# Patient Record
Sex: MALE | Race: WHITE | Employment: UNEMPLOYED | URBAN - METROPOLITAN AREA
[De-identification: names, ages, dates, MRNs, and addresses within clinical notes are randomized per-mention and may not be internally consistent; named-entity substitution may affect disease eponyms.]

---

## 2017-10-07 PROBLEM — Z72.0 TOBACCO ABUSE: Chronic | Status: ACTIVE | Noted: 2017-10-07

## 2017-10-07 PROBLEM — F12.10 MARIJUANA ABUSE: Chronic | Status: ACTIVE | Noted: 2017-10-07

## 2017-10-07 PROBLEM — L02.419 AXILLARY ABSCESS: Status: ACTIVE | Noted: 2017-10-07

## 2018-09-07 ENCOUNTER — HOSPITAL ENCOUNTER (EMERGENCY)
Age: 28
Discharge: HOME | End: 2018-09-07
Attending: EMERGENCY MEDICINE
Payer: MEDICAID

## 2018-09-07 VITALS
RESPIRATION RATE: 14 BRPM | DIASTOLIC BLOOD PRESSURE: 80 MMHG | HEIGHT: 70 IN | HEART RATE: 94 BPM | OXYGEN SATURATION: 100 % | BODY MASS INDEX: 21.9 KG/M2 | WEIGHT: 153 LBS | SYSTOLIC BLOOD PRESSURE: 136 MMHG | TEMPERATURE: 98.1 F

## 2018-09-07 DIAGNOSIS — J20.9 ACUTE BRONCHITIS, UNSPECIFIED ORGANISM: ICD-10-CM

## 2018-09-07 DIAGNOSIS — K08.89 PAIN, DENTAL: Primary | ICD-10-CM

## 2018-09-07 PROCEDURE — 99282 EMERGENCY DEPT VISIT SF MDM: CPT

## 2018-09-07 PROCEDURE — G0381 LEV 2 HOSP TYPE B ED VISIT: HCPCS

## 2018-09-07 RX ORDER — IBUPROFEN 800 MG/1
800 TABLET ORAL EVERY 8 HOURS PRN
Qty: 30 TABLET | Refills: 0 | Status: SHIPPED | OUTPATIENT
Start: 2018-09-07 | End: 2019-02-09 | Stop reason: SDUPTHER

## 2018-09-07 RX ORDER — BROMPHENIRAMINE MALEATE, PSEUDOEPHEDRINE HYDROCHLORIDE, AND DEXTROMETHORPHAN HYDROBROMIDE 2; 30; 10 MG/5ML; MG/5ML; MG/5ML
5 SYRUP ORAL 4 TIMES DAILY PRN
Qty: 120 ML | Refills: 0 | Status: SHIPPED | OUTPATIENT
Start: 2018-09-07 | End: 2019-02-09

## 2018-09-07 RX ORDER — METHOCARBAMOL 750 MG/1
750 TABLET, FILM COATED ORAL 2 TIMES DAILY
COMMUNITY
End: 2019-02-09

## 2018-09-07 RX ORDER — DOXYCYCLINE 100 MG/1
100 CAPSULE ORAL 2 TIMES DAILY
Qty: 20 CAPSULE | Refills: 0 | Status: SHIPPED | OUTPATIENT
Start: 2018-09-07 | End: 2019-02-09

## 2018-09-07 ASSESSMENT — PAIN DESCRIPTION - PAIN TYPE: TYPE: ACUTE PAIN

## 2018-09-07 ASSESSMENT — ENCOUNTER SYMPTOMS
NAUSEA: 0
SHORTNESS OF BREATH: 0
EYE PAIN: 0
EYE DISCHARGE: 0
EYE REDNESS: 0
SINUS PRESSURE: 0
DIARRHEA: 0
COUGH: 1
VOMITING: 0
BACK PAIN: 0
ABDOMINAL PAIN: 0
WHEEZING: 0
SORE THROAT: 0
RHINORRHEA: 1

## 2018-09-07 ASSESSMENT — PAIN SCALES - GENERAL: PAINLEVEL_OUTOF10: 10

## 2018-09-07 ASSESSMENT — PAIN DESCRIPTION - ORIENTATION: ORIENTATION: RIGHT

## 2018-09-07 NOTE — ED PROVIDER NOTES
The history is provided by the patient. Dental Pain   Location:  Generalized  Severity:  Moderate  Onset quality:  Gradual  Duration:  3 days  Chronicity:  Chronic  Context: dental caries    Associated symptoms: congestion    Associated symptoms: no fever and no headaches    URI   Presenting symptoms: congestion, cough and rhinorrhea    Presenting symptoms: no ear pain, no fever and no sore throat    Severity:  Moderate  Onset quality:  Gradual  Duration:  3 days  Progression:  Worsening  Chronicity:  New  Associated symptoms: no arthralgias, no headaches and no wheezing        Review of Systems   Constitutional: Negative for chills and fever. HENT: Positive for congestion and rhinorrhea. Negative for ear pain, sinus pressure and sore throat. Eyes: Negative for pain, discharge and redness. Respiratory: Positive for cough. Negative for shortness of breath and wheezing. Cardiovascular: Negative for chest pain. Gastrointestinal: Negative for abdominal pain, diarrhea, nausea and vomiting. Genitourinary: Negative for dysuria and frequency. Musculoskeletal: Negative for arthralgias and back pain. Skin: Negative for rash and wound. Neurological: Negative for weakness and headaches. Hematological: Negative for adenopathy. All other systems reviewed and are negative. Physical Exam   Constitutional: He is oriented to person, place, and time. He appears well-developed and well-nourished. HENT:   Head: Normocephalic and atraumatic. Right Ear: Hearing, tympanic membrane and external ear normal.   Left Ear: Hearing, tympanic membrane and external ear normal.   Nose: Mucosal edema and rhinorrhea present. Right sinus exhibits no maxillary sinus tenderness and no frontal sinus tenderness. Left sinus exhibits no maxillary sinus tenderness and no frontal sinus tenderness. Mouth/Throat: Uvula is midline, oropharynx is clear and moist and mucous membranes are normal. No trismus in the jaw.  Dental caries present. No uvula swelling. Eyes: Pupils are equal, round, and reactive to light. Conjunctivae, EOM and lids are normal.   Neck: Normal range of motion. Neck supple. Cardiovascular: Normal rate, regular rhythm and normal heart sounds. No murmur heard. Pulmonary/Chest: Effort normal. He has rhonchi. Abdominal: Soft. Bowel sounds are normal. There is no tenderness. There is no rigidity, no rebound, no guarding and no CVA tenderness. Musculoskeletal: He exhibits no edema. Neurological: He is alert and oriented to person, place, and time. He has normal strength. No cranial nerve deficit or sensory deficit. Coordination and gait normal. GCS eye subscore is 4. GCS verbal subscore is 5. GCS motor subscore is 6. Skin: Skin is warm and dry. No abrasion and no rash noted. Nursing note and vitals reviewed. Procedures    MDM            --------------------------------------------- PAST HISTORY ---------------------------------------------  Past Medical History:  has no past medical history on file. Past Surgical History:  has a past surgical history that includes Tonsillectomy. Social History:  reports that he has been smoking Cigarettes. He has a 10.00 pack-year smoking history. He has never used smokeless tobacco. He reports that he uses drugs, including Marijuana. He reports that he does not drink alcohol. Family History: Family history is unknown by patient. The patients home medications have been reviewed. Allergies: Penicillins    -------------------------------------------------- RESULTS -------------------------------------------------  Labs:  No results found for this visit on 09/07/18. Radiology:  No orders to display       ------------------------- NURSING NOTES AND VITALS REVIEWED ---------------------------  Date / Time Roomed:  9/7/2018  2:25 PM  ED Bed Assignment:  04/04    The nursing notes within the ED encounter and vital signs as below have been reviewed.

## 2019-02-09 ENCOUNTER — HOSPITAL ENCOUNTER (EMERGENCY)
Age: 29
Discharge: HOME | End: 2019-02-09
Payer: MEDICARE

## 2019-02-09 VITALS
TEMPERATURE: 97.9 F | SYSTOLIC BLOOD PRESSURE: 134 MMHG | HEART RATE: 87 BPM | DIASTOLIC BLOOD PRESSURE: 66 MMHG | HEIGHT: 70 IN | RESPIRATION RATE: 18 BRPM | OXYGEN SATURATION: 96 % | BODY MASS INDEX: 23.64 KG/M2 | WEIGHT: 165.13 LBS

## 2019-02-09 DIAGNOSIS — K08.89 PAIN, DENTAL: Primary | ICD-10-CM

## 2019-02-09 PROCEDURE — 6370000000 HC RX 637 (ALT 250 FOR IP): Performed by: NURSE PRACTITIONER

## 2019-02-09 PROCEDURE — 99282 EMERGENCY DEPT VISIT SF MDM: CPT

## 2019-02-09 RX ORDER — IBUPROFEN 400 MG/1
400 TABLET ORAL EVERY 6 HOURS PRN
Qty: 120 TABLET | Refills: 0 | Status: SHIPPED | OUTPATIENT
Start: 2019-02-09 | End: 2019-06-22 | Stop reason: ALTCHOICE

## 2019-02-09 RX ORDER — ACETAMINOPHEN 500 MG
500 TABLET ORAL EVERY 6 HOURS PRN
Status: ON HOLD | COMMUNITY
End: 2019-07-17 | Stop reason: ALTCHOICE

## 2019-02-09 RX ORDER — ACETAMINOPHEN 500 MG
1000 TABLET ORAL ONCE
Status: COMPLETED | OUTPATIENT
Start: 2019-02-09 | End: 2019-02-09

## 2019-02-09 RX ORDER — IBUPROFEN 800 MG/1
800 TABLET ORAL ONCE
Status: COMPLETED | OUTPATIENT
Start: 2019-02-09 | End: 2019-02-09

## 2019-02-09 RX ORDER — CLINDAMYCIN HYDROCHLORIDE 300 MG/1
300 CAPSULE ORAL 4 TIMES DAILY
Qty: 28 CAPSULE | Refills: 0 | Status: SHIPPED | OUTPATIENT
Start: 2019-02-09 | End: 2019-02-16

## 2019-02-09 RX ORDER — CLINDAMYCIN HYDROCHLORIDE 150 MG/1
300 CAPSULE ORAL ONCE
Status: COMPLETED | OUTPATIENT
Start: 2019-02-09 | End: 2019-02-09

## 2019-02-09 RX ADMIN — ACETAMINOPHEN 1000 MG: 500 TABLET, FILM COATED ORAL at 01:14

## 2019-02-09 RX ADMIN — CLINDAMYCIN HYDROCHLORIDE 300 MG: 150 CAPSULE ORAL at 01:14

## 2019-02-09 RX ADMIN — IBUPROFEN 800 MG: 800 TABLET, FILM COATED ORAL at 01:14

## 2019-02-09 ASSESSMENT — PAIN DESCRIPTION - LOCATION: LOCATION: TEETH

## 2019-02-09 ASSESSMENT — PAIN DESCRIPTION - DESCRIPTORS: DESCRIPTORS: ACHING;BURNING

## 2019-02-09 ASSESSMENT — PAIN SCALES - GENERAL: PAINLEVEL_OUTOF10: 10

## 2019-02-09 ASSESSMENT — PAIN - FUNCTIONAL ASSESSMENT: PAIN_FUNCTIONAL_ASSESSMENT: PREVENTS OR INTERFERES SOME ACTIVE ACTIVITIES AND ADLS

## 2019-02-09 ASSESSMENT — PAIN DESCRIPTION - FREQUENCY: FREQUENCY: CONTINUOUS

## 2019-02-09 ASSESSMENT — PAIN DESCRIPTION - PAIN TYPE: TYPE: ACUTE PAIN

## 2019-02-09 ASSESSMENT — PAIN DESCRIPTION - PROGRESSION: CLINICAL_PROGRESSION: NOT CHANGED

## 2019-06-22 ENCOUNTER — HOSPITAL ENCOUNTER (EMERGENCY)
Age: 29
Discharge: HOME | End: 2019-06-22
Payer: MEDICARE

## 2019-06-22 ENCOUNTER — APPOINTMENT (OUTPATIENT)
Dept: GENERAL RADIOLOGY | Age: 29
End: 2019-06-22
Payer: MEDICARE

## 2019-06-22 VITALS
HEART RATE: 78 BPM | BODY MASS INDEX: 21.47 KG/M2 | WEIGHT: 150 LBS | DIASTOLIC BLOOD PRESSURE: 76 MMHG | HEIGHT: 70 IN | OXYGEN SATURATION: 97 % | SYSTOLIC BLOOD PRESSURE: 146 MMHG | RESPIRATION RATE: 18 BRPM | TEMPERATURE: 98.3 F

## 2019-06-22 DIAGNOSIS — S20.211A CONTUSION OF RIGHT CHEST WALL, INITIAL ENCOUNTER: Primary | ICD-10-CM

## 2019-06-22 DIAGNOSIS — S20.211A BRUISED RIBS, RIGHT, INITIAL ENCOUNTER: ICD-10-CM

## 2019-06-22 PROCEDURE — 6370000000 HC RX 637 (ALT 250 FOR IP): Performed by: NURSE PRACTITIONER

## 2019-06-22 PROCEDURE — 99285 EMERGENCY DEPT VISIT HI MDM: CPT

## 2019-06-22 PROCEDURE — 71101 X-RAY EXAM UNILAT RIBS/CHEST: CPT

## 2019-06-22 RX ORDER — NAPROXEN 500 MG/1
500 TABLET ORAL 2 TIMES DAILY WITH MEALS
Qty: 28 TABLET | Refills: 0 | Status: SHIPPED | OUTPATIENT
Start: 2019-06-22 | End: 2019-07-24 | Stop reason: ALTCHOICE

## 2019-06-22 RX ORDER — KETOROLAC TROMETHAMINE 10 MG/1
10 TABLET, FILM COATED ORAL ONCE
Status: COMPLETED | OUTPATIENT
Start: 2019-06-22 | End: 2019-06-22

## 2019-06-22 RX ORDER — LIDOCAINE 4 G/G
1 PATCH TOPICAL DAILY
Status: DISCONTINUED | OUTPATIENT
Start: 2019-06-23 | End: 2019-06-23 | Stop reason: HOSPADM

## 2019-06-22 RX ORDER — OXYCODONE HYDROCHLORIDE AND ACETAMINOPHEN 5; 325 MG/1; MG/1
1 TABLET ORAL ONCE
Status: COMPLETED | OUTPATIENT
Start: 2019-06-22 | End: 2019-06-22

## 2019-06-22 RX ORDER — OXYCODONE HYDROCHLORIDE AND ACETAMINOPHEN 5; 325 MG/1; MG/1
1 TABLET ORAL EVERY 6 HOURS PRN
Qty: 12 TABLET | Refills: 0 | Status: SHIPPED | OUTPATIENT
Start: 2019-06-22 | End: 2019-06-25

## 2019-06-22 RX ADMIN — KETOROLAC TROMETHAMINE 10 MG: 10 TABLET, FILM COATED ORAL at 22:37

## 2019-06-22 RX ADMIN — OXYCODONE HYDROCHLORIDE AND ACETAMINOPHEN 1 TABLET: 5; 325 TABLET ORAL at 22:37

## 2019-06-22 ASSESSMENT — PAIN DESCRIPTION - PAIN TYPE: TYPE: ACUTE PAIN

## 2019-06-22 ASSESSMENT — PAIN SCALES - GENERAL
PAINLEVEL_OUTOF10: 10
PAINLEVEL_OUTOF10: 10

## 2019-06-22 ASSESSMENT — PAIN DESCRIPTION - LOCATION: LOCATION: CHEST

## 2019-06-23 NOTE — ED NOTES
Patient's SMI 1500. He was instructed on use of incentive spirometer 10 times every two hrs. He returns demonstration. He had lidocaine patch applied. He did not wish to have prescription for any additional patches. He states he wants to leave. Given instructions and medicated. He left with friend who is driving.        Mitchell Krishnan RN  06/22/19 3703
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
## 2019-06-23 NOTE — ED NOTES
Patient is walking out of room into thakur. When asked where he is going he states \" Just out here\". I asked patient if he was going outside and he said \"no\". His friend is with him. I informed him that his xrays are back and that I anticipated that someone would be back to see him and talk to him about results. He continued to walk out of room.        Oneil Burns RN  06/22/19 [id number]

## 2019-07-14 ENCOUNTER — APPOINTMENT (OUTPATIENT)
Dept: GENERAL RADIOLOGY | Age: 29
DRG: 720 | End: 2019-07-14
Payer: MEDICARE

## 2019-07-14 ENCOUNTER — APPOINTMENT (OUTPATIENT)
Dept: CT IMAGING | Age: 29
DRG: 720 | End: 2019-07-14
Payer: MEDICARE

## 2019-07-14 ENCOUNTER — HOSPITAL ENCOUNTER (EMERGENCY)
Age: 29
Discharge: HOME | DRG: 720 | End: 2019-07-14
Attending: EMERGENCY MEDICINE
Payer: MEDICARE

## 2019-07-14 VITALS
OXYGEN SATURATION: 98 % | WEIGHT: 150 LBS | RESPIRATION RATE: 15 BRPM | DIASTOLIC BLOOD PRESSURE: 73 MMHG | HEART RATE: 94 BPM | BODY MASS INDEX: 22.22 KG/M2 | SYSTOLIC BLOOD PRESSURE: 116 MMHG | TEMPERATURE: 100.2 F | HEIGHT: 69 IN

## 2019-07-14 DIAGNOSIS — I95.1 ORTHOSTATIC SYNCOPE: Primary | ICD-10-CM

## 2019-07-14 DIAGNOSIS — T67.5XXA HEAT EXHAUSTION, INITIAL ENCOUNTER: ICD-10-CM

## 2019-07-14 DIAGNOSIS — F19.10 SUBSTANCE ABUSE (HCC): ICD-10-CM

## 2019-07-14 LAB
ALBUMIN SERPL-MCNC: 4.7 G/DL (ref 3.5–5.2)
ALP BLD-CCNC: 104 U/L (ref 40–129)
ALT SERPL-CCNC: 83 U/L (ref 0–40)
AMPHETAMINE SCREEN, URINE: NOT DETECTED
ANION GAP SERPL CALCULATED.3IONS-SCNC: 11 MMOL/L (ref 7–16)
AST SERPL-CCNC: 182 U/L (ref 0–39)
BACTERIA: ABNORMAL /HPF
BARBITURATE SCREEN URINE: NOT DETECTED
BASOPHILS ABSOLUTE: 0 E9/L (ref 0–0.2)
BASOPHILS RELATIVE PERCENT: 0 % (ref 0–2)
BENZODIAZEPINE SCREEN, URINE: NOT DETECTED
BILIRUB SERPL-MCNC: 0.6 MG/DL (ref 0–1.2)
BILIRUBIN URINE: NEGATIVE
BLOOD, URINE: NEGATIVE
BUN BLDV-MCNC: 17 MG/DL (ref 6–20)
CALCIUM SERPL-MCNC: 9.2 MG/DL (ref 8.6–10.2)
CANNABINOID SCREEN URINE: NOT DETECTED
CHLORIDE BLD-SCNC: 98 MMOL/L (ref 98–107)
CHP ED QC CHECK: NORMAL
CLARITY: CLEAR
CO2: 27 MMOL/L (ref 22–29)
COCAINE METABOLITE SCREEN URINE: POSITIVE
COLOR: YELLOW
CREAT SERPL-MCNC: 1.1 MG/DL (ref 0.7–1.2)
EOSINOPHILS ABSOLUTE: 0 E9/L (ref 0.05–0.5)
EOSINOPHILS RELATIVE PERCENT: 0 % (ref 0–6)
EPITHELIAL CELLS, UA: ABNORMAL /HPF
GFR AFRICAN AMERICAN: >60
GFR NON-AFRICAN AMERICAN: >60 ML/MIN/1.73
GLUCOSE BLD-MCNC: 101 MG/DL (ref 74–99)
GLUCOSE BLD-MCNC: 99 MG/DL
GLUCOSE URINE: NEGATIVE MG/DL
HCT VFR BLD CALC: 40.8 % (ref 37–54)
HEMOGLOBIN: 13.6 G/DL (ref 12.5–16.5)
KETONES, URINE: NEGATIVE MG/DL
LEUKOCYTE ESTERASE, URINE: NEGATIVE
LYMPHOCYTES ABSOLUTE: 0.34 E9/L (ref 1.5–4)
LYMPHOCYTES RELATIVE PERCENT: 6 % (ref 20–42)
MCH RBC QN AUTO: 30.2 PG (ref 26–35)
MCHC RBC AUTO-ENTMCNC: 33.3 % (ref 32–34.5)
MCV RBC AUTO: 90.5 FL (ref 80–99.9)
METAMYELOCYTES RELATIVE PERCENT: 2 % (ref 0–1)
METER GLUCOSE: 99 MG/DL (ref 74–99)
METHADONE SCREEN, URINE: NOT DETECTED
MONOCYTES ABSOLUTE: 0 E9/L (ref 0.1–0.95)
MONOCYTES RELATIVE PERCENT: 0 % (ref 2–12)
NEUTROPHILS ABSOLUTE: 5.26 E9/L (ref 1.8–7.3)
NEUTROPHILS RELATIVE PERCENT: 92 % (ref 43–80)
NITRITE, URINE: NEGATIVE
OPIATE SCREEN URINE: POSITIVE
PDW BLD-RTO: 12.9 FL (ref 11.5–15)
PH UA: 6 (ref 5–9)
PHENCYCLIDINE SCREEN URINE: NOT DETECTED
PLATELET # BLD: 228 E9/L (ref 130–450)
PMV BLD AUTO: 9 FL (ref 7–12)
POTASSIUM SERPL-SCNC: 4 MMOL/L (ref 3.5–5)
PROPOXYPHENE SCREEN: NOT DETECTED
PROTEIN UA: NEGATIVE MG/DL
RBC # BLD: 4.51 E12/L (ref 3.8–5.8)
RBC # BLD: NORMAL 10*6/UL
RBC UA: ABNORMAL /HPF (ref 0–2)
SODIUM BLD-SCNC: 136 MMOL/L (ref 132–146)
SPECIFIC GRAVITY UA: <=1.005 (ref 1–1.03)
TOTAL PROTEIN: 7.2 G/DL (ref 6.4–8.3)
TROPONIN: <0.01 NG/ML (ref 0–0.03)
UROBILINOGEN, URINE: 0.2 E.U./DL
WBC # BLD: 5.6 E9/L (ref 4.5–11.5)
WBC UA: ABNORMAL /HPF (ref 0–5)

## 2019-07-14 PROCEDURE — 85025 COMPLETE CBC W/AUTO DIFF WBC: CPT

## 2019-07-14 PROCEDURE — 81001 URINALYSIS AUTO W/SCOPE: CPT

## 2019-07-14 PROCEDURE — 87186 SC STD MICRODIL/AGAR DIL: CPT

## 2019-07-14 PROCEDURE — 70450 CT HEAD/BRAIN W/O DYE: CPT

## 2019-07-14 PROCEDURE — 80053 COMPREHEN METABOLIC PANEL: CPT

## 2019-07-14 PROCEDURE — G0480 DRUG TEST DEF 1-7 CLASSES: HCPCS

## 2019-07-14 PROCEDURE — 6370000000 HC RX 637 (ALT 250 FOR IP): Performed by: STUDENT IN AN ORGANIZED HEALTH CARE EDUCATION/TRAINING PROGRAM

## 2019-07-14 PROCEDURE — 87077 CULTURE AEROBIC IDENTIFY: CPT

## 2019-07-14 PROCEDURE — 72132 CT LUMBAR SPINE W/DYE: CPT

## 2019-07-14 PROCEDURE — 36415 COLL VENOUS BLD VENIPUNCTURE: CPT

## 2019-07-14 PROCEDURE — 6360000002 HC RX W HCPCS: Performed by: STUDENT IN AN ORGANIZED HEALTH CARE EDUCATION/TRAINING PROGRAM

## 2019-07-14 PROCEDURE — 84484 ASSAY OF TROPONIN QUANT: CPT

## 2019-07-14 PROCEDURE — 87040 BLOOD CULTURE FOR BACTERIA: CPT

## 2019-07-14 PROCEDURE — 99284 EMERGENCY DEPT VISIT MOD MDM: CPT

## 2019-07-14 PROCEDURE — 80307 DRUG TEST PRSMV CHEM ANLYZR: CPT

## 2019-07-14 PROCEDURE — 87088 URINE BACTERIA CULTURE: CPT

## 2019-07-14 PROCEDURE — 96374 THER/PROPH/DIAG INJ IV PUSH: CPT

## 2019-07-14 PROCEDURE — 2580000003 HC RX 258: Performed by: STUDENT IN AN ORGANIZED HEALTH CARE EDUCATION/TRAINING PROGRAM

## 2019-07-14 PROCEDURE — 71046 X-RAY EXAM CHEST 2 VIEWS: CPT

## 2019-07-14 PROCEDURE — 82962 GLUCOSE BLOOD TEST: CPT

## 2019-07-14 PROCEDURE — 6360000004 HC RX CONTRAST MEDICATION: Performed by: RADIOLOGY

## 2019-07-14 RX ORDER — ACETAMINOPHEN 500 MG
1000 TABLET ORAL ONCE
Status: COMPLETED | OUTPATIENT
Start: 2019-07-14 | End: 2019-07-14

## 2019-07-14 RX ORDER — KETOROLAC TROMETHAMINE 15 MG/ML
15 INJECTION, SOLUTION INTRAMUSCULAR; INTRAVENOUS ONCE
Status: COMPLETED | OUTPATIENT
Start: 2019-07-14 | End: 2019-07-14

## 2019-07-14 RX ORDER — 0.9 % SODIUM CHLORIDE 0.9 %
1000 INTRAVENOUS SOLUTION INTRAVENOUS ONCE
Status: COMPLETED | OUTPATIENT
Start: 2019-07-14 | End: 2019-07-14

## 2019-07-14 RX ADMIN — SODIUM CHLORIDE 1000 ML: 9 INJECTION, SOLUTION INTRAVENOUS at 21:04

## 2019-07-14 RX ADMIN — ACETAMINOPHEN 1000 MG: 500 TABLET, FILM COATED ORAL at 21:04

## 2019-07-14 RX ADMIN — KETOROLAC TROMETHAMINE 15 MG: 15 INJECTION, SOLUTION INTRAMUSCULAR; INTRAVENOUS at 21:04

## 2019-07-14 RX ADMIN — SODIUM CHLORIDE 1000 ML: 9 INJECTION, SOLUTION INTRAVENOUS at 21:48

## 2019-07-14 RX ADMIN — IOPAMIDOL 80 ML: 755 INJECTION, SOLUTION INTRAVENOUS at 21:43

## 2019-07-14 ASSESSMENT — PAIN DESCRIPTION - PAIN TYPE
TYPE: ACUTE PAIN
TYPE: ACUTE PAIN

## 2019-07-14 ASSESSMENT — ENCOUNTER SYMPTOMS
NAUSEA: 0
EYE DISCHARGE: 0
COUGH: 0
VOMITING: 0
EYE REDNESS: 0
DIARRHEA: 0
ABDOMINAL PAIN: 0
SINUS PRESSURE: 0
SHORTNESS OF BREATH: 0
EYE PAIN: 0
WHEEZING: 0
SORE THROAT: 0
BACK PAIN: 1

## 2019-07-14 ASSESSMENT — PAIN DESCRIPTION - LOCATION: LOCATION: GENERALIZED

## 2019-07-14 ASSESSMENT — PAIN SCALES - GENERAL
PAINLEVEL_OUTOF10: 10
PAINLEVEL_OUTOF10: 10
PAINLEVEL_OUTOF10: 7

## 2019-07-15 LAB
ACETAMINOPHEN LEVEL: <5 MCG/ML (ref 10–30)
ETHANOL: <10 MG/DL (ref 0–0.08)
SALICYLATE, SERUM: <0.3 MG/DL (ref 0–30)
TRICYCLIC ANTIDEPRESSANTS SCREEN SERUM: NEGATIVE NG/ML

## 2019-07-15 NOTE — ED NOTES
Patient ambulated to and from the bathroom per self without assistance, Dr Yelitza Cole aware.       Sonali Sommers RN  07/14/19 0088

## 2019-07-15 NOTE — ED PROVIDER NOTES
arthralgias. Skin: Negative for rash and wound. Neurological: Positive for syncope. Negative for weakness and headaches. Hematological: Negative for adenopathy. All other systems reviewed and are negative. Physical Exam   Constitutional: He is oriented to person, place, and time. He appears well-developed and well-nourished. No distress. And oriented x3. No acute distress. Speaking full sentences. Warm to the touch. HENT:   Head: Normocephalic and atraumatic. Eyes: Pupils are equal, round, and reactive to light. Conjunctivae and EOM are normal.   Neck: Normal range of motion. Neck supple. Cardiovascular: Regular rhythm, S1 normal, S2 normal, normal heart sounds and intact distal pulses. Tachycardia present. No murmur heard. Pulmonary/Chest: Effort normal and breath sounds normal. No stridor. No respiratory distress. He has no wheezes. He has no rales. Abdominal: Soft. He exhibits no distension and no mass. There is no tenderness. There is no rebound and no guarding. Musculoskeletal:        Lumbar back: He exhibits decreased range of motion, tenderness and bony tenderness. He exhibits no swelling, no edema, no deformity, no laceration, no pain and no spasm. No gross deformity of the upper or lower extremities bilaterally. No peripheral edema, erythema, or temperature discrepancy when comparing bilateral lower extremities. Neurological: He is alert and oriented to person, place, and time. Skin: Skin is warm and dry. No rash noted. He is not diaphoretic. No janeway lesions or osler nodes   Nursing note and vitals reviewed. Procedures    MDM  Number of Diagnoses or Management Options  Heat exhaustion, initial encounter:   Orthostatic syncope:   Substance abuse [facility]):   Diagnosis management comments: Patient presents to be evaluated for syncopal episode.   The patient initially was elevated temperature upon arrival.  Therefore a septic work-up was performed with the differential being heat exhaustion and exposure versus infectious cause of the elevated temperature. Patient was without any obvious evidence of infection on physical examination. He had no leukocytosis or elevation of other inflammatory markers suggesting an infectious source of his elevated temperature. His temperature came down with IV fluids in the emergency department. He was noted to be orthostatic positive during his stay, which I believe is the cause of his syncopal episode. There is no obvious evidence of injury on physical examination. CT scan of the head and lumbar spine were also negative for any acute injuries. Of note, the CT scan of his lumbar spine is negative for any infectious process given his history of IV drug abuse. Patient tolerated oral intake in the department was able to ambulate without difficulty at the time of discharge. Vital signs stable at time of discharge. Return instructions provided. Patient discharged in stable condition. EKG Interpretation    Interpreted by emergency department physician    Clinical Impression: Sinus tachycardia. 103 bpm.  No ST segment elevations or depressions. No T wave ab normalities or inversions. Left ventricular hypertrophy. Q waves in the inferior leads. No old EKG for comparison. Orlando Rear          --------------------------------------------- PAST HISTORY ---------------------------------------------  Past Medical History:  has no past medical history on file. Past Surgical History:  has a past surgical history that includes Tonsillectomy. Social History:  reports that he has been smoking cigarettes. He has a 10.00 pack-year smoking history. He has never used smokeless tobacco. He reports that he drank alcohol. He reports that he has current or past drug history. Drug: Marijuana. Family History: Family history is unknown by patient. The patients home medications have been reviewed.     Allergies:

## 2019-07-16 ENCOUNTER — APPOINTMENT (OUTPATIENT)
Dept: ULTRASOUND IMAGING | Age: 29
DRG: 720 | End: 2019-07-16
Payer: MEDICARE

## 2019-07-16 ENCOUNTER — HOSPITAL ENCOUNTER (INPATIENT)
Age: 29
LOS: 1 days | Discharge: LEFT BEFORE DISCHARGE | DRG: 720 | End: 2019-07-17
Attending: EMERGENCY MEDICINE | Admitting: INTERNAL MEDICINE
Payer: MEDICARE

## 2019-07-16 ENCOUNTER — APPOINTMENT (OUTPATIENT)
Dept: CT IMAGING | Age: 29
DRG: 720 | End: 2019-07-16
Payer: MEDICARE

## 2019-07-16 DIAGNOSIS — R78.81 BACTEREMIA: Primary | ICD-10-CM

## 2019-07-16 DIAGNOSIS — A41.9 SEPTICEMIA (HCC): ICD-10-CM

## 2019-07-16 LAB
ALBUMIN SERPL-MCNC: 4 G/DL (ref 3.5–5.2)
ALP BLD-CCNC: 77 U/L (ref 40–129)
ALT SERPL-CCNC: 56 U/L (ref 0–40)
ANION GAP SERPL CALCULATED.3IONS-SCNC: 9 MMOL/L (ref 7–16)
AST SERPL-CCNC: 33 U/L (ref 0–39)
BASOPHILS ABSOLUTE: 0.05 E9/L (ref 0–0.2)
BASOPHILS RELATIVE PERCENT: 0.3 % (ref 0–2)
BILIRUB SERPL-MCNC: 0.2 MG/DL (ref 0–1.2)
BILIRUBIN URINE: NEGATIVE
BLOOD, URINE: NEGATIVE
BUN BLDV-MCNC: 15 MG/DL (ref 6–20)
CALCIUM SERPL-MCNC: 9.5 MG/DL (ref 8.6–10.2)
CHLORIDE BLD-SCNC: 98 MMOL/L (ref 98–107)
CLARITY: CLEAR
CO2: 27 MMOL/L (ref 22–29)
CO2: 27 MMOL/L (ref 22–29)
COLOR: YELLOW
CREAT SERPL-MCNC: 1 MG/DL (ref 0.7–1.2)
EOSINOPHILS ABSOLUTE: 0.1 E9/L (ref 0.05–0.5)
EOSINOPHILS RELATIVE PERCENT: 0.7 % (ref 0–6)
GFR AFRICAN AMERICAN: >60
GFR AFRICAN AMERICAN: >60
GFR NON-AFRICAN AMERICAN: >60 ML/MIN/1.73
GFR NON-AFRICAN AMERICAN: >60 ML/MIN/1.73
GLUCOSE BLD-MCNC: 88 MG/DL (ref 74–99)
GLUCOSE BLD-MCNC: 92 MG/DL (ref 74–99)
GLUCOSE URINE: NEGATIVE MG/DL
HCT VFR BLD CALC: 38.2 % (ref 37–54)
HEMOGLOBIN: 12.2 G/DL (ref 12.5–16.5)
IMMATURE GRANULOCYTES #: 0.07 E9/L
IMMATURE GRANULOCYTES %: 0.5 % (ref 0–5)
KETONES, URINE: NEGATIVE MG/DL
LACTIC ACID: 1 MMOL/L (ref 0.5–2.2)
LEUKOCYTE ESTERASE, URINE: NEGATIVE
LYMPHOCYTES ABSOLUTE: 1.81 E9/L (ref 1.5–4)
LYMPHOCYTES RELATIVE PERCENT: 11.8 % (ref 20–42)
MCH RBC QN AUTO: 29.8 PG (ref 26–35)
MCHC RBC AUTO-ENTMCNC: 31.9 % (ref 32–34.5)
MCV RBC AUTO: 93.4 FL (ref 80–99.9)
MONOCYTES ABSOLUTE: 1.08 E9/L (ref 0.1–0.95)
MONOCYTES RELATIVE PERCENT: 7 % (ref 2–12)
NEUTROPHILS ABSOLUTE: 12.21 E9/L (ref 1.8–7.3)
NEUTROPHILS RELATIVE PERCENT: 79.7 % (ref 43–80)
NITRITE, URINE: NEGATIVE
PDW BLD-RTO: 13.1 FL (ref 11.5–15)
PH UA: 7 (ref 5–9)
PLATELET # BLD: 223 E9/L (ref 130–450)
PMV BLD AUTO: 9.8 FL (ref 7–12)
POC ANION GAP: 8 MMOL/L (ref 7–16)
POC BUN: 16 MG/DL (ref 8–23)
POC CHLORIDE: 99 MMOL/L (ref 100–108)
POC CREATININE: 0.9 MG/DL (ref 0.7–1.2)
POC POTASSIUM: 4.1 MMOL/L (ref 3.5–5)
POC SODIUM: 134 MMOL/L (ref 132–146)
POTASSIUM SERPL-SCNC: 4.3 MMOL/L (ref 3.5–5)
PROTEIN UA: NEGATIVE MG/DL
RBC # BLD: 4.09 E12/L (ref 3.8–5.8)
SODIUM BLD-SCNC: 134 MMOL/L (ref 132–146)
SPECIFIC GRAVITY UA: 1.01 (ref 1–1.03)
TOTAL CK: 83 U/L (ref 20–200)
TOTAL PROTEIN: 7.6 G/DL (ref 6.4–8.3)
TROPONIN: <0.01 NG/ML (ref 0–0.03)
UROBILINOGEN, URINE: 0.2 E.U./DL
WBC # BLD: 15.3 E9/L (ref 4.5–11.5)

## 2019-07-16 PROCEDURE — 86140 C-REACTIVE PROTEIN: CPT

## 2019-07-16 PROCEDURE — 87088 URINE BACTERIA CULTURE: CPT

## 2019-07-16 PROCEDURE — 80053 COMPREHEN METABOLIC PANEL: CPT

## 2019-07-16 PROCEDURE — 85025 COMPLETE CBC W/AUTO DIFF WBC: CPT

## 2019-07-16 PROCEDURE — 2580000003 HC RX 258: Performed by: EMERGENCY MEDICINE

## 2019-07-16 PROCEDURE — 84484 ASSAY OF TROPONIN QUANT: CPT

## 2019-07-16 PROCEDURE — 93971 EXTREMITY STUDY: CPT

## 2019-07-16 PROCEDURE — 96365 THER/PROPH/DIAG IV INF INIT: CPT

## 2019-07-16 PROCEDURE — 36415 COLL VENOUS BLD VENIPUNCTURE: CPT

## 2019-07-16 PROCEDURE — 87040 BLOOD CULTURE FOR BACTERIA: CPT

## 2019-07-16 PROCEDURE — 84520 ASSAY OF UREA NITROGEN: CPT

## 2019-07-16 PROCEDURE — 6360000004 HC RX CONTRAST MEDICATION: Performed by: RADIOLOGY

## 2019-07-16 PROCEDURE — 82947 ASSAY GLUCOSE BLOOD QUANT: CPT

## 2019-07-16 PROCEDURE — 99285 EMERGENCY DEPT VISIT HI MDM: CPT

## 2019-07-16 PROCEDURE — 73701 CT LOWER EXTREMITY W/DYE: CPT

## 2019-07-16 PROCEDURE — 87186 SC STD MICRODIL/AGAR DIL: CPT

## 2019-07-16 PROCEDURE — 81003 URINALYSIS AUTO W/O SCOPE: CPT

## 2019-07-16 PROCEDURE — 80051 ELECTROLYTE PANEL: CPT

## 2019-07-16 PROCEDURE — 85651 RBC SED RATE NONAUTOMATED: CPT

## 2019-07-16 PROCEDURE — 83605 ASSAY OF LACTIC ACID: CPT

## 2019-07-16 PROCEDURE — 82565 ASSAY OF CREATININE: CPT

## 2019-07-16 PROCEDURE — 6360000002 HC RX W HCPCS: Performed by: EMERGENCY MEDICINE

## 2019-07-16 PROCEDURE — 74177 CT ABD & PELVIS W/CONTRAST: CPT

## 2019-07-16 PROCEDURE — 82550 ASSAY OF CK (CPK): CPT

## 2019-07-16 RX ADMIN — IOPAMIDOL 80 ML: 755 INJECTION, SOLUTION INTRAVENOUS at 22:44

## 2019-07-16 RX ADMIN — VANCOMYCIN HYDROCHLORIDE 1500 MG: 10 INJECTION, POWDER, LYOPHILIZED, FOR SOLUTION INTRAVENOUS at 23:17

## 2019-07-16 ASSESSMENT — ENCOUNTER SYMPTOMS
SHORTNESS OF BREATH: 0
VOMITING: 0
ALLERGIC/IMMUNOLOGIC NEGATIVE: 1
EYES NEGATIVE: 1
ABDOMINAL PAIN: 1
CHEST TIGHTNESS: 0
DIARRHEA: 0
NAUSEA: 0

## 2019-07-16 ASSESSMENT — PAIN DESCRIPTION - ORIENTATION: ORIENTATION: RIGHT

## 2019-07-16 ASSESSMENT — PAIN SCALES - GENERAL: PAINLEVEL_OUTOF10: 10

## 2019-07-16 ASSESSMENT — PAIN DESCRIPTION - LOCATION: LOCATION: HIP

## 2019-07-16 ASSESSMENT — PAIN DESCRIPTION - DESCRIPTORS: DESCRIPTORS: SHARP

## 2019-07-16 NOTE — ED NOTES
Pt called to triage for vital signs, unable to locate at this time.      Chhaya Ford, GREGORY  07/16/19 2824

## 2019-07-17 VITALS
DIASTOLIC BLOOD PRESSURE: 61 MMHG | OXYGEN SATURATION: 97 % | BODY MASS INDEX: 22.4 KG/M2 | SYSTOLIC BLOOD PRESSURE: 123 MMHG | HEIGHT: 71 IN | RESPIRATION RATE: 16 BRPM | WEIGHT: 160 LBS | HEART RATE: 61 BPM | TEMPERATURE: 98.1 F

## 2019-07-17 PROBLEM — R78.81 BACTEREMIA: Status: ACTIVE | Noted: 2019-07-17

## 2019-07-17 LAB
BILIRUBIN URINE: NEGATIVE
BLOOD, URINE: NEGATIVE
C-REACTIVE PROTEIN: 7.1 MG/DL (ref 0–0.4)
CK MB: 1.1 NG/ML (ref 0–7.7)
CLARITY: CLEAR
COLOR: YELLOW
GLUCOSE URINE: NEGATIVE MG/DL
KETONES, URINE: NEGATIVE MG/DL
LEUKOCYTE ESTERASE, URINE: NEGATIVE
LV EF: 58 %
LVEF MODALITY: NORMAL
NITRITE, URINE: NEGATIVE
PH UA: 7 (ref 5–9)
PROTEIN UA: NEGATIVE MG/DL
SEDIMENTATION RATE, ERYTHROCYTE: 28 MM/HR (ref 0–15)
SPECIFIC GRAVITY UA: 1.01 (ref 1–1.03)
TOTAL CK: 68 U/L (ref 20–200)
TROPONIN: <0.01 NG/ML (ref 0–0.03)
URINE CULTURE, ROUTINE: NORMAL
UROBILINOGEN, URINE: 0.2 E.U./DL

## 2019-07-17 PROCEDURE — 6360000002 HC RX W HCPCS: Performed by: INTERNAL MEDICINE

## 2019-07-17 PROCEDURE — 6360000002 HC RX W HCPCS: Performed by: SPECIALIST

## 2019-07-17 PROCEDURE — 2580000003 HC RX 258: Performed by: SPECIALIST

## 2019-07-17 PROCEDURE — 36415 COLL VENOUS BLD VENIPUNCTURE: CPT

## 2019-07-17 PROCEDURE — 93306 TTE W/DOPPLER COMPLETE: CPT

## 2019-07-17 PROCEDURE — 6370000000 HC RX 637 (ALT 250 FOR IP): Performed by: INTERNAL MEDICINE

## 2019-07-17 PROCEDURE — 84484 ASSAY OF TROPONIN QUANT: CPT

## 2019-07-17 PROCEDURE — 82550 ASSAY OF CK (CPK): CPT

## 2019-07-17 PROCEDURE — 81003 URINALYSIS AUTO W/O SCOPE: CPT

## 2019-07-17 PROCEDURE — 2580000003 HC RX 258: Performed by: INTERNAL MEDICINE

## 2019-07-17 PROCEDURE — 82553 CREATINE MB FRACTION: CPT

## 2019-07-17 PROCEDURE — 1200000000 HC SEMI PRIVATE

## 2019-07-17 RX ORDER — SODIUM CHLORIDE 0.9 % (FLUSH) 0.9 %
10 SYRINGE (ML) INJECTION PRN
Status: DISCONTINUED | OUTPATIENT
Start: 2019-07-17 | End: 2019-07-17 | Stop reason: HOSPADM

## 2019-07-17 RX ORDER — SODIUM CHLORIDE 0.9 % (FLUSH) 0.9 %
10 SYRINGE (ML) INJECTION EVERY 12 HOURS SCHEDULED
Status: DISCONTINUED | OUTPATIENT
Start: 2019-07-17 | End: 2019-07-17 | Stop reason: HOSPADM

## 2019-07-17 RX ORDER — POTASSIUM CHLORIDE AND SODIUM CHLORIDE 450; 150 MG/100ML; MG/100ML
INJECTION, SOLUTION INTRAVENOUS CONTINUOUS
Status: DISCONTINUED | OUTPATIENT
Start: 2019-07-17 | End: 2019-07-17 | Stop reason: SDUPTHER

## 2019-07-17 RX ORDER — LEVOFLOXACIN 5 MG/ML
750 INJECTION, SOLUTION INTRAVENOUS EVERY 24 HOURS
Status: DISCONTINUED | OUTPATIENT
Start: 2019-07-17 | End: 2019-07-17 | Stop reason: HOSPADM

## 2019-07-17 RX ORDER — ONDANSETRON 2 MG/ML
4 INJECTION INTRAMUSCULAR; INTRAVENOUS EVERY 6 HOURS PRN
Status: DISCONTINUED | OUTPATIENT
Start: 2019-07-17 | End: 2019-07-17 | Stop reason: HOSPADM

## 2019-07-17 RX ORDER — SODIUM CHLORIDE AND POTASSIUM CHLORIDE .9; .15 G/100ML; G/100ML
SOLUTION INTRAVENOUS CONTINUOUS
Status: DISCONTINUED | OUTPATIENT
Start: 2019-07-17 | End: 2019-07-17 | Stop reason: HOSPADM

## 2019-07-17 RX ORDER — HYDROCODONE BITARTRATE AND ACETAMINOPHEN 5; 325 MG/1; MG/1
1 TABLET ORAL EVERY 6 HOURS PRN
Status: DISCONTINUED | OUTPATIENT
Start: 2019-07-17 | End: 2019-07-17 | Stop reason: HOSPADM

## 2019-07-17 RX ADMIN — ENOXAPARIN SODIUM 40 MG: 40 INJECTION SUBCUTANEOUS at 09:15

## 2019-07-17 RX ADMIN — CEFEPIME 2 G: 2 INJECTION, POWDER, FOR SOLUTION INTRAVENOUS at 11:01

## 2019-07-17 RX ADMIN — POTASSIUM CHLORIDE AND SODIUM CHLORIDE: 900; 150 INJECTION, SOLUTION INTRAVENOUS at 09:15

## 2019-07-17 RX ADMIN — POTASSIUM CHLORIDE AND SODIUM CHLORIDE: 900; 150 INJECTION, SOLUTION INTRAVENOUS at 09:25

## 2019-07-17 RX ADMIN — Medication 10 ML: at 09:26

## 2019-07-17 RX ADMIN — HYDROCODONE BITARTRATE AND ACETAMINOPHEN 1 TABLET: 5; 325 TABLET ORAL at 09:14

## 2019-07-17 ASSESSMENT — PAIN DESCRIPTION - LOCATION
LOCATION: HIP
LOCATION: HIP

## 2019-07-17 ASSESSMENT — PAIN DESCRIPTION - PAIN TYPE
TYPE: ACUTE PAIN
TYPE: ACUTE PAIN

## 2019-07-17 ASSESSMENT — PAIN SCALES - GENERAL
PAINLEVEL_OUTOF10: 10
PAINLEVEL_OUTOF10: 10

## 2019-07-17 ASSESSMENT — PAIN DESCRIPTION - DESCRIPTORS
DESCRIPTORS: ACHING;CONSTANT;DISCOMFORT;SORE
DESCRIPTORS: ACHING;CONSTANT;SHARP

## 2019-07-17 ASSESSMENT — PAIN DESCRIPTION - ORIENTATION
ORIENTATION: RIGHT
ORIENTATION: RIGHT

## 2019-07-17 ASSESSMENT — PAIN DESCRIPTION - PROGRESSION
CLINICAL_PROGRESSION: GRADUALLY WORSENING
CLINICAL_PROGRESSION: GRADUALLY WORSENING

## 2019-07-17 ASSESSMENT — PAIN DESCRIPTION - FREQUENCY
FREQUENCY: CONTINUOUS
FREQUENCY: CONTINUOUS

## 2019-07-17 ASSESSMENT — PAIN DESCRIPTION - ONSET
ONSET: ON-GOING
ONSET: ON-GOING

## 2019-07-17 ASSESSMENT — PAIN - FUNCTIONAL ASSESSMENT: PAIN_FUNCTIONAL_ASSESSMENT: PREVENTS OR INTERFERES WITH MANY ACTIVE NOT PASSIVE ACTIVITIES

## 2019-07-17 NOTE — CONSULTS
[address] [place] [address] [facility]  Consult Note    [address] [facility] [address]  [place], [address] [employer]  Phone [phone]   [phone]      Admit Date: 2019  7:30 PM  Pt Name: Collette Saul  MRN: [id number]  : 1990  Reason for Consult:    Chief Complaint   Patient presents with    Other     CALLED TO RETURN TO ED FOR POS BLOOD CULTURES    Hip Pain     RT HIP PAIN AFTER FALL     Requesting Physician:  Augustus Goodman DO  PCP: MYA Alston NP  History Obtained From:  patient  ID consulted for Bacteremia [R78.81]  on hospital day 0  CHIEF COMPLAINT       Chief Complaint   Patient presents with    Other     CALLED TO RETURN TO ED FOR POS BLOOD CULTURES    Hip Pain     RT HIP PAIN AFTER FALL     HISTORYOF PRESENT ILLNESS      Collette Saul is a 34 y.o. male who presents with significant past medical history of  has no past medical history on file. who presents with   Chief Complaint   Patient presents with    Other     CALLED TO RETURN TO ED FOR POS BLOOD CULTURES    Hip Pain     RT HIP PAIN AFTER FALL     ED TRIAGEVITALS  BP: 116/62, Temp: 98.5 °F (36.9 °C), Pulse: 52, Resp: 16, SpO2: 98 %  HPI  PT HAS C/O HIP RIGHT PAIN. HE WA SIN ER  PER CHART FOR LOSS OF CONSCIOUSNESS. HE HAS H/O IVDU/HEROINE. HEHAD BLOOD CX DRAWN  THEY BECAME POSITIVE THAT DAY. PT WAS CALLED SAME DAY  AND HE DID NOT RETURN TIL ,  AT THIS TIME HE HAS C/O HIPPAIN. HE IS EATING BREAKFAST. HE DENIES F/C/N/V/D. HE DENIES TRAUMA.    WBC15.3 CR1  HE RECEIVED VANCO ONCE  CEFEPIME AND LEVAQUIN    REVIEW OF SYSTEMS    (2-9 systems for level 4, 10 or more for level 5)     REVIEW OFSYSTEMS:    CONSTITUTIONAL:   No fever, chills, weight loss  ALLERGIES:    No urticaria, hay fever,    EYES:     No blurry vision, loss of vision,eye pain  ENT:      No hearing loss, sore throat  CARDIOVASCULAR:  No chest pain or palpitations  RESPIRATORY:   No cough, sob  ENDOCRINE:    No increase thirst, were also obtained. CT technique includes automated exposure control. FINDINGS; The ventricular system and cortical sulci are age appropriate in size, shape and configuration. No space occupying mass lesion. There is no midline shift or mass effect. No intracerebral or extra axial fluid collection/ hemorrhage is seen. An old lacunar infarct in the right basal ganglia as before. Lattie Halsted and white matter differentiation is preserved with normal myelination. The visualized brainstem appears unremarkable. The visualized posterior fossa appears unremarkable. The visualized skull vault bones are grossly unremarkable. No significant osseous abnormality. Visualized paranasal sinuses are clear. Visualized orbital regions are grossly unremarkable bilaterally. 1. No acute intracranial pathology. 2. An old lacunar infarct in the right basal ganglia as before. Ct Lumbar Spine W Contrast    Result Date: 7/14/2019  Location: 200 CT LUMBAR SPINE WITH  IV CONTRAST WITH RECONSTRUCTION Clinical indication: Low back pain with fever Contrast: 80 cc of Isovue-370 IV contrast No previous study for comparison. TECHNIQUE; Serial axial straight cuts were obtained through the lumbar spine following administration of IV contrast followed by reconstruction in sagittal and coronal planes. CT technique includes automated exposure control. FINDINGS; The vertebral bodies are normal in heights and shows no acute compression fracture. The lumbar spine maintains its anatomic lordosis without significant spondylolisthesis. There is no spondylolysis. No significant scoliotic changes. The visualized pedicles appear unremarkable. The visualized transverse processes appear unremarkable. The visualized spinous processes appear unremarkable. The posterior facet joints are unremarkable. The spinal canal is intact. Neural foramina are normally patent bilaterally throughout the lumbosacral spine. Intervertebral disc heights are preserved.  The sacroiliac joint is unremarkable bilaterally. The pre or paraspinal soft tissues are unremarkable. There is no gross HNP. No abnormal enhancement is demonstrated. Normal study. Ct Abdomen Pelvis W Iv Contrast Additional Contrast? None    Result Date: 7/16/2019  Location: 200 CT ABDOMEN AND PELVIS WITH IV CONTRAST Clinical Indication: Lower abdominal pain Contrast: 80 mL of Isovue-370 IV contrast. No previous study for comparison. TECHNIQUE; Serial axial images through the abdomen and pelvis were obtained following IV contrast and coronal and sagittal reformatted images were also obtained. CT technique includes automated exposure control. FINDINGS; The visualized portion of the lungs is unremarkable. No hiatal hernia. The liver enhances homogenously without focal lesion and is mildly enlarged. The gallbladder is contracted and small but is grossly unremarkable with no pericholecystic fluid collection. No abnormal intra or extrahepatic biliary ductal dilatation. The spleen enhances homogenously and is grossly unremarkable. The pancreas enhances homogenously and is grossly unremarkable. The adrenal glands are unremarkable bilaterally. The right kidney enhances homogeneously and shows no evidence of hydronephrosis or perinephric fluid. The left kidney enhances homogeneously and shows no evidence of hydronephrosis or perinephric fluid. No definite nephrolithiasis on either side. No signs of acute appendicitis. Fecal materials are seen throughout the colon including the rectum. The visualized opacified bowel loops are otherwise grossly unremarkable and the mesentery is clear. No free air or fluid in the abdomen. No free air or fluid in the pelvis. No focal collection. No enlarged retroperitoneal or pelvic lymphadenopathy. Pelvic contents are grossly unremarkable. The abdominal aorta is normal in caliber without dissection. The urinary bladder is unremarkable. The prostate gland is also unremarkable.  No inguinal Patient presents with    Other     CALLED TO RETURN TO ED FOR POS BLOOD CULTURES    Hip Pain     RT HIP PAIN AFTER FALL        FINAL IMPRESSION    Gn septicemia R/O DISCITIS/OM/ABSCESS/SEPTIC RIGHT HIP   ivdu       F/U BLOOD CX  received vanco  CEFEPIME/LEVAQUIN TIL C XFINAL   Hiv/hep sceeen    Imaging and labs were reviewed per medical records and any ID pertinent labs were addressed with the patient. The patient/FAMILY  was educated about the diagnosis, prognosis, indications, risks and benefits of treatment. The patient/FAMILY is in agreement with the proposed medical treatment plan. An opportunity to ask questions was given to the patient/FAMILY and questions were answered. Thank you for the consult. We will follow with you.        Electronically signed by Nicki Leong MD on 7/17/2019 at 7:48 AM

## 2019-07-17 NOTE — H&P
[address]                              HISTORY AND PHYSICAL    PATIENT NAME: Isaiah Cramer               :        1990  MED REC NO:   [id number]                            ROOM:       [id number]  ACCOUNT NO:   [de-identified]                           ADMIT DATE: 2019  PROVIDER:     Elisha eSthi DO    The patient left AMA. CHIEF COMPLAINT:  This is a 28-year-old white male who was admitted to  the hospital here under the service of Dr. Nabil Francisco and Dr. Nhung Albarran _____ on  2019. The patient initially presented to the hospital here late  in the emergency room in . The patient went to hospital here with  what appeared to be positive blood cultures. The patient states he was  here several days ago for relapsing heroin abuse and has been clean. Blood culture came back positive at this time. Many efforts were made  to advise the patient to return. Subsequently, he did return today. After using heroin, he was admitted to the hospital at this time  complaining of severe hip pain. The patient was admitted under the  service of Dr. Nabil Francisco and Dr. Nhung Albarran. REVIEW OF SYSTEMS:  Otherwise unremarkable. HEENT:  Denies any  symptoms. CARDIOVASCULAR:  Denies any complaint. No chest pain or  palpitation. RESPIRATORY:  He did not appear to be short of breath. GASTROINTESTINAL:  He did complain of some vague abdominal pain. No  nausea, vomiting, diarrhea, or constipation. There was no evidence of  any edema. MUSCULOSKELETAL:  Positive for hip pain. ALLERGIES:  He was allergic to PENICILLIN. CODE STATUS:  The patient was a full code. PAST MEDICAL HISTORY:  As listed was history for basically negative  medical things other than history of heroin abuse. PAST SURGICAL HISTORY:  Positive for tonsillectomy. FAMILY HISTORY:  Noncontributory. SOCIAL HISTORY:  The patient did smoke.   Denies

## 2019-07-18 LAB
CULTURE, BLOOD 2: ABNORMAL
CULTURE, BLOOD 2: ABNORMAL
ORGANISM: ABNORMAL

## 2019-07-18 NOTE — SIGNIFICANT EVENT
Patient's echocardiogram revealed suspected growth on the mitral valve, possible endocarditis. Called listed [phone]. Went to voicemail with without proper identification therefore message left with hospital phone number and for him to call the hospital  and have me paged. Dionna Garrido D.O., FHeriA.C.O.I.  7/18/2019  7:09 AM

## 2019-07-19 LAB
BLOOD CULTURE, ROUTINE: ABNORMAL
BLOOD CULTURE, ROUTINE: ABNORMAL
COCAINE, CONFIRM, URINE: >1000 NG/ML
ORGANISM: ABNORMAL
ORGANISM: ABNORMAL
URINE CULTURE, ROUTINE: NORMAL

## 2019-07-20 LAB
6AM URINE: 104 NG/ML
CODEINE, URINE: <20 NG/ML
HYDROCODONE, URINE: <20 NG/ML
HYDROMORPHONE, URINE: <20 NG/ML
MORPHINE URINE: 224 NG/ML
NORHYDROCODONE, URINE: <20 NG/ML
NOROXYCODONE, URINE: <20 NG/ML
NOROXYMORPHONE, URINE: <20 NG/ML
OXYCODONE, URINE CONFIRMATION: <20 NG/ML
OXYMORPHONE, URINE: <20 NG/ML

## 2019-07-22 LAB
BLOOD CULTURE, ROUTINE: ABNORMAL
BLOOD CULTURE, ROUTINE: ABNORMAL
CULTURE, BLOOD 2: ABNORMAL
CULTURE, BLOOD 2: ABNORMAL
ORGANISM: ABNORMAL
ORGANISM: ABNORMAL

## 2019-07-23 ENCOUNTER — HOSPITAL ENCOUNTER (EMERGENCY)
Age: 29
Discharge: HOME | DRG: 720 | End: 2019-07-23
Attending: EMERGENCY MEDICINE
Payer: MEDICARE

## 2019-07-23 ENCOUNTER — APPOINTMENT (OUTPATIENT)
Dept: GENERAL RADIOLOGY | Age: 29
DRG: 720 | End: 2019-07-23
Payer: MEDICARE

## 2019-07-23 VITALS
OXYGEN SATURATION: 99 % | TEMPERATURE: 98 F | HEART RATE: 70 BPM | DIASTOLIC BLOOD PRESSURE: 70 MMHG | BODY MASS INDEX: 20.65 KG/M2 | RESPIRATION RATE: 18 BRPM | HEIGHT: 70 IN | SYSTOLIC BLOOD PRESSURE: 125 MMHG | WEIGHT: 144.25 LBS

## 2019-07-23 DIAGNOSIS — F19.90 IV DRUG USER: ICD-10-CM

## 2019-07-23 DIAGNOSIS — R78.81 POSITIVE BLOOD CULTURE: ICD-10-CM

## 2019-07-23 DIAGNOSIS — I33.0 ACUTE BACTERIAL ENDOCARDITIS: Primary | ICD-10-CM

## 2019-07-23 LAB
ALBUMIN SERPL-MCNC: 4.2 G/DL (ref 3.5–5.2)
ALP BLD-CCNC: 71 U/L (ref 40–129)
ALT SERPL-CCNC: 15 U/L (ref 0–40)
ANION GAP SERPL CALCULATED.3IONS-SCNC: 10 MMOL/L (ref 7–16)
AST SERPL-CCNC: 22 U/L (ref 0–39)
BASOPHILS ABSOLUTE: 0.08 E9/L (ref 0–0.2)
BASOPHILS RELATIVE PERCENT: 1 % (ref 0–2)
BILIRUB SERPL-MCNC: 0.3 MG/DL (ref 0–1.2)
BUN BLDV-MCNC: 15 MG/DL (ref 6–20)
CALCIUM SERPL-MCNC: 9.1 MG/DL (ref 8.6–10.2)
CHLORIDE BLD-SCNC: 98 MMOL/L (ref 98–107)
CO2: 28 MMOL/L (ref 22–29)
CREAT SERPL-MCNC: 0.9 MG/DL (ref 0.7–1.2)
EOSINOPHILS ABSOLUTE: 0.18 E9/L (ref 0.05–0.5)
EOSINOPHILS RELATIVE PERCENT: 2.2 % (ref 0–6)
GFR AFRICAN AMERICAN: >60
GFR NON-AFRICAN AMERICAN: >60 ML/MIN/1.73
GLUCOSE BLD-MCNC: 94 MG/DL (ref 74–99)
HCT VFR BLD CALC: 38.3 % (ref 37–54)
HEMOGLOBIN: 12.4 G/DL (ref 12.5–16.5)
IMMATURE GRANULOCYTES #: 0.03 E9/L
IMMATURE GRANULOCYTES %: 0.4 % (ref 0–5)
LYMPHOCYTES ABSOLUTE: 2.26 E9/L (ref 1.5–4)
LYMPHOCYTES RELATIVE PERCENT: 27.1 % (ref 20–42)
MCH RBC QN AUTO: 29.6 PG (ref 26–35)
MCHC RBC AUTO-ENTMCNC: 32.4 % (ref 32–34.5)
MCV RBC AUTO: 91.4 FL (ref 80–99.9)
MONOCYTES ABSOLUTE: 0.52 E9/L (ref 0.1–0.95)
MONOCYTES RELATIVE PERCENT: 6.2 % (ref 2–12)
NEUTROPHILS ABSOLUTE: 5.27 E9/L (ref 1.8–7.3)
NEUTROPHILS RELATIVE PERCENT: 63.1 % (ref 43–80)
PDW BLD-RTO: 12.8 FL (ref 11.5–15)
PLATELET # BLD: 369 E9/L (ref 130–450)
PMV BLD AUTO: 9.2 FL (ref 7–12)
POTASSIUM SERPL-SCNC: 4.9 MMOL/L (ref 3.5–5)
RBC # BLD: 4.19 E12/L (ref 3.8–5.8)
SEDIMENTATION RATE, ERYTHROCYTE: 35 MM/HR (ref 0–15)
SODIUM BLD-SCNC: 136 MMOL/L (ref 132–146)
TOTAL CK: 176 U/L (ref 20–200)
TOTAL PROTEIN: 7.5 G/DL (ref 6.4–8.3)
TROPONIN: <0.01 NG/ML (ref 0–0.03)
WBC # BLD: 8.3 E9/L (ref 4.5–11.5)

## 2019-07-23 PROCEDURE — 96366 THER/PROPH/DIAG IV INF ADDON: CPT

## 2019-07-23 PROCEDURE — 36415 COLL VENOUS BLD VENIPUNCTURE: CPT

## 2019-07-23 PROCEDURE — 87040 BLOOD CULTURE FOR BACTERIA: CPT

## 2019-07-23 PROCEDURE — 2580000003 HC RX 258: Performed by: NURSE PRACTITIONER

## 2019-07-23 PROCEDURE — 84484 ASSAY OF TROPONIN QUANT: CPT

## 2019-07-23 PROCEDURE — 6360000002 HC RX W HCPCS: Performed by: NURSE PRACTITIONER

## 2019-07-23 PROCEDURE — 85025 COMPLETE CBC W/AUTO DIFF WBC: CPT

## 2019-07-23 PROCEDURE — 99284 EMERGENCY DEPT VISIT MOD MDM: CPT

## 2019-07-23 PROCEDURE — 73502 X-RAY EXAM HIP UNI 2-3 VIEWS: CPT

## 2019-07-23 PROCEDURE — 93005 ELECTROCARDIOGRAM TRACING: CPT | Performed by: NURSE PRACTITIONER

## 2019-07-23 PROCEDURE — 86140 C-REACTIVE PROTEIN: CPT

## 2019-07-23 PROCEDURE — 80053 COMPREHEN METABOLIC PANEL: CPT

## 2019-07-23 PROCEDURE — 82550 ASSAY OF CK (CPK): CPT

## 2019-07-23 PROCEDURE — 85651 RBC SED RATE NONAUTOMATED: CPT

## 2019-07-23 PROCEDURE — 96365 THER/PROPH/DIAG IV INF INIT: CPT

## 2019-07-23 RX ADMIN — VANCOMYCIN HYDROCHLORIDE 1500 MG: 10 INJECTION, POWDER, LYOPHILIZED, FOR SOLUTION INTRAVENOUS at 19:54

## 2019-07-23 ASSESSMENT — PAIN DESCRIPTION - LOCATION: LOCATION: HIP

## 2019-07-23 ASSESSMENT — PAIN DESCRIPTION - ONSET: ONSET: ON-GOING

## 2019-07-23 ASSESSMENT — PAIN SCALES - GENERAL: PAINLEVEL_OUTOF10: 7

## 2019-07-23 ASSESSMENT — PAIN DESCRIPTION - PAIN TYPE: TYPE: ACUTE PAIN

## 2019-07-23 ASSESSMENT — PAIN DESCRIPTION - DESCRIPTORS: DESCRIPTORS: DISCOMFORT

## 2019-07-23 ASSESSMENT — PAIN DESCRIPTION - ORIENTATION: ORIENTATION: RIGHT

## 2019-07-23 ASSESSMENT — PAIN DESCRIPTION - FREQUENCY: FREQUENCY: CONTINUOUS

## 2019-07-23 ASSESSMENT — PAIN - FUNCTIONAL ASSESSMENT: PAIN_FUNCTIONAL_ASSESSMENT: PREVENTS OR INTERFERES SOME ACTIVE ACTIVITIES AND ADLS

## 2019-07-23 NOTE — ED PROVIDER NOTES
ED Attending  CC: Martha     Department of Emergency Medicine   ED  Provider Note  Admit Date/RoomTime: 7/23/2019  6:06 PM  ED Room: NATHALIA/NATHALIA   Chief Complaint   Hip Pain (pt adm to hosp 5 days ago for hip infection and signed out [facility] states still having pain)    History of Present Illness   Source of history provided by:  patient. History/Exam Limitations: none. Huan Cabello is a 34 y.o. old male who  has no past medical history on file. , presents to the emergency department by private vehicle, for right hip pain which occured 1 week(s) prior to arrival.  Mechanism of injury/pain was result of none. Since onset the symptoms have been moderate in degree. His pain is aggraveated by movement and use, relieved by nothing and associated with none. Tetanus Status: unknown. There has been no history of fever, chills, headache, chest pain, shortness of breath, cough, abdominal pain, nausea, vomiting, diarrhea, constipation, dysuria, rash. Patient was instructed to return to the emergency department today related to IV drug use endocarditis and positive blood cultures. Patient was recently admitted and signed out AMA. Patient states that he is currently taking a family members amoxicillin and feeling better. ROS    Pertinent positives and negatives are stated within HPI, all other systems reviewed and are negative. Past Surgical History:   Procedure Laterality Date    TONSILLECTOMY     Social History:  reports that he has been smoking cigarettes. He has a 10.00 pack-year smoking history. He has never used smokeless tobacco. He reports that he drank alcohol. He reports that he has current or past drug history. Drug: Marijuana. Family History: Family history is unknown by patient.   Allergies: Penicillins    Physical Exam           ED Triage Vitals [07/23/19 1803]   BP Temp Temp Source Pulse Resp SpO2 Height Weight   (!) 116/56 98 °F (36.7 °C) Oral 66 16 98 % 5' 10\" (1.778 m) 144 lb 4 oz (65.4 kg)

## 2019-07-24 ENCOUNTER — HOSPITAL ENCOUNTER (INPATIENT)
Age: 29
LOS: 1 days | Discharge: LEFT BEFORE DISCHARGE | DRG: 720 | End: 2019-07-25
Attending: EMERGENCY MEDICINE | Admitting: INTERNAL MEDICINE
Payer: MEDICARE

## 2019-07-24 ENCOUNTER — HOSPITAL ENCOUNTER (EMERGENCY)
Age: 29
Discharge: HOME | DRG: 720 | End: 2019-07-24
Attending: EMERGENCY MEDICINE
Payer: MEDICARE

## 2019-07-24 ENCOUNTER — APPOINTMENT (OUTPATIENT)
Dept: GENERAL RADIOLOGY | Age: 29
DRG: 720 | End: 2019-07-24
Payer: MEDICARE

## 2019-07-24 VITALS
OXYGEN SATURATION: 97 % | BODY MASS INDEX: 20.76 KG/M2 | RESPIRATION RATE: 16 BRPM | WEIGHT: 145 LBS | SYSTOLIC BLOOD PRESSURE: 126 MMHG | TEMPERATURE: 98.2 F | DIASTOLIC BLOOD PRESSURE: 55 MMHG | HEIGHT: 70 IN | HEART RATE: 69 BPM

## 2019-07-24 DIAGNOSIS — R78.81 MRSA BACTEREMIA: Primary | ICD-10-CM

## 2019-07-24 DIAGNOSIS — B95.62 MRSA BACTEREMIA: Primary | ICD-10-CM

## 2019-07-24 DIAGNOSIS — I33.0 BACTERIAL ENDOCARDITIS, UNSPECIFIED CHRONICITY: Primary | ICD-10-CM

## 2019-07-24 DIAGNOSIS — I33.0 ACUTE BACTERIAL ENDOCARDITIS: ICD-10-CM

## 2019-07-24 LAB
AMPHETAMINE SCREEN, URINE: NOT DETECTED
APTT: 30.7 SEC (ref 24.5–35.1)
BARBITURATE SCREEN URINE: NOT DETECTED
BASOPHILS ABSOLUTE: 0.05 E9/L (ref 0–0.2)
BASOPHILS RELATIVE PERCENT: 0.6 % (ref 0–2)
BENZODIAZEPINE SCREEN, URINE: NOT DETECTED
BILIRUBIN URINE: NEGATIVE
BLOOD, URINE: NEGATIVE
C-REACTIVE PROTEIN: 2.9 MG/DL (ref 0–0.4)
CANNABINOID SCREEN URINE: NOT DETECTED
CLARITY: CLEAR
COCAINE METABOLITE SCREEN URINE: POSITIVE
COLOR: YELLOW
EKG ATRIAL RATE: 47 BPM
EKG P AXIS: 40 DEGREES
EKG P-R INTERVAL: 124 MS
EKG Q-T INTERVAL: 456 MS
EKG QRS DURATION: 92 MS
EKG QTC CALCULATION (BAZETT): 403 MS
EKG R AXIS: 75 DEGREES
EKG T AXIS: 55 DEGREES
EKG VENTRICULAR RATE: 47 BPM
EOSINOPHILS ABSOLUTE: 0.21 E9/L (ref 0.05–0.5)
EOSINOPHILS RELATIVE PERCENT: 2.4 % (ref 0–6)
GLUCOSE URINE: NEGATIVE MG/DL
HCT VFR BLD CALC: 37.7 % (ref 37–54)
HEMOGLOBIN: 12.4 G/DL (ref 12.5–16.5)
IMMATURE GRANULOCYTES #: 0.02 E9/L
IMMATURE GRANULOCYTES %: 0.2 % (ref 0–5)
INR BLD: 1
KETONES, URINE: NEGATIVE MG/DL
LACTIC ACID, SEPSIS: 1 MMOL/L (ref 0.5–1.9)
LEUKOCYTE ESTERASE, URINE: NEGATIVE
LIPASE: 15 U/L (ref 13–60)
LYMPHOCYTES ABSOLUTE: 2.65 E9/L (ref 1.5–4)
LYMPHOCYTES RELATIVE PERCENT: 29.8 % (ref 20–42)
MCH RBC QN AUTO: 29.5 PG (ref 26–35)
MCHC RBC AUTO-ENTMCNC: 32.9 % (ref 32–34.5)
MCV RBC AUTO: 89.8 FL (ref 80–99.9)
METHADONE SCREEN, URINE: NOT DETECTED
MONOCYTES ABSOLUTE: 0.52 E9/L (ref 0.1–0.95)
MONOCYTES RELATIVE PERCENT: 5.8 % (ref 2–12)
NEUTROPHILS ABSOLUTE: 5.45 E9/L (ref 1.8–7.3)
NEUTROPHILS RELATIVE PERCENT: 61.2 % (ref 43–80)
NITRITE, URINE: NEGATIVE
OPIATE SCREEN URINE: POSITIVE
PDW BLD-RTO: 12.9 FL (ref 11.5–15)
PH UA: 6 (ref 5–9)
PHENCYCLIDINE SCREEN URINE: NOT DETECTED
PLATELET # BLD: 384 E9/L (ref 130–450)
PMV BLD AUTO: 9.3 FL (ref 7–12)
PROPOXYPHENE SCREEN: NOT DETECTED
PROTEIN UA: NEGATIVE MG/DL
PROTHROMBIN TIME: 10.8 SEC (ref 9.3–12.4)
RBC # BLD: 4.2 E12/L (ref 3.8–5.8)
REASON FOR REJECTION: NORMAL
REJECTED TEST: NORMAL
SPECIFIC GRAVITY UA: 1.02 (ref 1–1.03)
UROBILINOGEN, URINE: 0.2 E.U./DL
WBC # BLD: 8.9 E9/L (ref 4.5–11.5)

## 2019-07-24 PROCEDURE — 83605 ASSAY OF LACTIC ACID: CPT

## 2019-07-24 PROCEDURE — 85025 COMPLETE CBC W/AUTO DIFF WBC: CPT

## 2019-07-24 PROCEDURE — 36415 COLL VENOUS BLD VENIPUNCTURE: CPT

## 2019-07-24 PROCEDURE — 81003 URINALYSIS AUTO W/O SCOPE: CPT

## 2019-07-24 PROCEDURE — 99285 EMERGENCY DEPT VISIT HI MDM: CPT

## 2019-07-24 PROCEDURE — G0480 DRUG TEST DEF 1-7 CLASSES: HCPCS

## 2019-07-24 PROCEDURE — 85610 PROTHROMBIN TIME: CPT

## 2019-07-24 PROCEDURE — 80307 DRUG TEST PRSMV CHEM ANLYZR: CPT

## 2019-07-24 PROCEDURE — 87040 BLOOD CULTURE FOR BACTERIA: CPT

## 2019-07-24 PROCEDURE — 83690 ASSAY OF LIPASE: CPT

## 2019-07-24 PROCEDURE — 93005 ELECTROCARDIOGRAM TRACING: CPT | Performed by: EMERGENCY MEDICINE

## 2019-07-24 PROCEDURE — 71046 X-RAY EXAM CHEST 2 VIEWS: CPT

## 2019-07-24 PROCEDURE — 93010 ELECTROCARDIOGRAM REPORT: CPT | Performed by: INTERNAL MEDICINE

## 2019-07-24 PROCEDURE — 99283 EMERGENCY DEPT VISIT LOW MDM: CPT

## 2019-07-24 PROCEDURE — 1200000000 HC SEMI PRIVATE

## 2019-07-24 PROCEDURE — 85730 THROMBOPLASTIN TIME PARTIAL: CPT

## 2019-07-24 ASSESSMENT — ENCOUNTER SYMPTOMS
BACK PAIN: 0
SINUS PRESSURE: 0
EYE DISCHARGE: 0
EYE REDNESS: 0
SORE THROAT: 0
EYE PAIN: 0
COUGH: 0
DIARRHEA: 0
VOMITING: 0
EYE REDNESS: 0
WHEEZING: 0
NAUSEA: 0
EYE DISCHARGE: 0
ABDOMINAL PAIN: 0
VOMITING: 0
SHORTNESS OF BREATH: 0
SINUS PRESSURE: 0
COUGH: 0
ABDOMINAL PAIN: 0
WHEEZING: 0
EYE PAIN: 0
SORE THROAT: 0
SHORTNESS OF BREATH: 0
NAUSEA: 0
DIARRHEA: 0
BACK PAIN: 0

## 2019-07-24 ASSESSMENT — PAIN SCALES - GENERAL: PAINLEVEL_OUTOF10: 8

## 2019-07-24 ASSESSMENT — PAIN DESCRIPTION - PAIN TYPE: TYPE: ACUTE PAIN

## 2019-07-24 ASSESSMENT — PAIN DESCRIPTION - LOCATION: LOCATION: HIP

## 2019-07-24 ASSESSMENT — PAIN DESCRIPTION - ORIENTATION: ORIENTATION: RIGHT

## 2019-07-24 NOTE — ED PROVIDER NOTES
The patient is a 62-year-old male the past medical history of IV drug abuse that presents the emergency department for positive blood cultures. Patient was seen here in the emergency department on July 14, 2019 and was noted to be febrile at that time. Blood cultures were drawn, but the patient was ultimately discharged. He was called back because his blood cultures returned positive for staph aureus. He returned to the emergency department and was admitted for 1 day when he received IV vancomycin. An echocardiogram was performed which was suggestive of a mitral valve vegetation. However, the patient left the hospital [address] He returned to the hospital due to the positive blood cultures and was seen in the emergency department last night and received 1 dose of IV vancomycin. However, he then left AGAINST MEDICAL ADVICE once again. He returned today to seek treatment. He denies any fevers or chills. He states that he is been feeling fine at home without any complaints. His only complaint at this time is that he is hungry and wishes to smoke a cigarette. The history is provided by the patient. Review of Systems   Constitutional: Negative for chills and fever. HENT: Negative for ear pain, sinus pressure and sore throat. Eyes: Negative for pain, discharge and redness. Respiratory: Negative for cough, shortness of breath and wheezing. Cardiovascular: Negative for chest pain. Gastrointestinal: Negative for abdominal pain, diarrhea, nausea and vomiting. Genitourinary: Negative for dysuria and frequency. Musculoskeletal: Negative for arthralgias and back pain. Skin: Negative for rash and wound. Neurological: Negative for weakness and headaches. Hematological: Negative for adenopathy. All other systems reviewed and are negative. Physical Exam   Constitutional: He is oriented to person, place, and time. He appears well-developed and well-nourished.  No Penicillins    -------------------------------------------------- RESULTS -------------------------------------------------  Labs:  No results found for this visit on 07/24/19. Radiology:  No orders to display       ------------------------- NURSING NOTES AND VITALS REVIEWED ---------------------------  Date / Time Roomed:  7/24/2019  2:16 PM  ED Bed Assignment:  NATHALIA/NATHALIA    The nursing notes within the ED encounter and vital signs as below have been reviewed. BP (!) 126/55   Pulse 69   Temp 98.2 °F (36.8 °C)   Resp 16   Ht 5' 10\" (1.778 m)   Wt 145 lb (65.8 kg)   SpO2 97%   BMI 20.81 kg/m²   Oxygen Saturation Interpretation: Normal      ------------------------------------------ PROGRESS NOTES ------------------------------------------  Patient left AMA. He understands the risks of doing so. New Prescriptions    No medications on file       Diagnosis:  1.  Bacterial endocarditis, unspecified chronicity        Disposition:  Patient's disposition: left AMA               García Daniels DO  Resident  07/24/19 3225

## 2019-07-25 VITALS
BODY MASS INDEX: 20.76 KG/M2 | RESPIRATION RATE: 18 BRPM | TEMPERATURE: 98 F | SYSTOLIC BLOOD PRESSURE: 107 MMHG | HEART RATE: 59 BPM | HEIGHT: 70 IN | OXYGEN SATURATION: 98 % | DIASTOLIC BLOOD PRESSURE: 68 MMHG | WEIGHT: 145 LBS

## 2019-07-25 LAB
ACETAMINOPHEN LEVEL: <5 MCG/ML (ref 10–30)
ALBUMIN SERPL-MCNC: 4 G/DL (ref 3.5–5.2)
ALP BLD-CCNC: 65 U/L (ref 40–129)
ALT SERPL-CCNC: 13 U/L (ref 0–40)
ANION GAP SERPL CALCULATED.3IONS-SCNC: 10 MMOL/L (ref 7–16)
AST SERPL-CCNC: 18 U/L (ref 0–39)
BILIRUB SERPL-MCNC: 0.3 MG/DL (ref 0–1.2)
BUN BLDV-MCNC: 18 MG/DL (ref 6–20)
CALCIUM SERPL-MCNC: 9 MG/DL (ref 8.6–10.2)
CHLORIDE BLD-SCNC: 98 MMOL/L (ref 98–107)
CO2: 28 MMOL/L (ref 22–29)
CREAT SERPL-MCNC: 1 MG/DL (ref 0.7–1.2)
EKG ATRIAL RATE: 52 BPM
EKG P AXIS: 90 DEGREES
EKG P-R INTERVAL: 124 MS
EKG Q-T INTERVAL: 418 MS
EKG QRS DURATION: 88 MS
EKG QTC CALCULATION (BAZETT): 388 MS
EKG R AXIS: 76 DEGREES
EKG T AXIS: 57 DEGREES
EKG VENTRICULAR RATE: 52 BPM
ETHANOL: <10 MG/DL (ref 0–0.08)
GFR AFRICAN AMERICAN: >60
GFR NON-AFRICAN AMERICAN: >60 ML/MIN/1.73
GLUCOSE BLD-MCNC: 112 MG/DL (ref 74–99)
POTASSIUM REFLEX MAGNESIUM: 4.2 MMOL/L (ref 3.5–5)
SALICYLATE, SERUM: <0.3 MG/DL (ref 0–30)
SODIUM BLD-SCNC: 136 MMOL/L (ref 132–146)
TOTAL CK: 133 U/L (ref 20–200)
TOTAL PROTEIN: 7.4 G/DL (ref 6.4–8.3)
TRICYCLIC ANTIDEPRESSANTS SCREEN SERUM: NEGATIVE NG/ML
TROPONIN: <0.01 NG/ML (ref 0–0.03)

## 2019-07-25 PROCEDURE — 36415 COLL VENOUS BLD VENIPUNCTURE: CPT

## 2019-07-25 PROCEDURE — 80053 COMPREHEN METABOLIC PANEL: CPT

## 2019-07-25 PROCEDURE — 6360000002 HC RX W HCPCS: Performed by: EMERGENCY MEDICINE

## 2019-07-25 PROCEDURE — 84484 ASSAY OF TROPONIN QUANT: CPT

## 2019-07-25 PROCEDURE — 80307 DRUG TEST PRSMV CHEM ANLYZR: CPT

## 2019-07-25 PROCEDURE — 6370000000 HC RX 637 (ALT 250 FOR IP): Performed by: INTERNAL MEDICINE

## 2019-07-25 PROCEDURE — 2580000003 HC RX 258: Performed by: EMERGENCY MEDICINE

## 2019-07-25 PROCEDURE — 93010 ELECTROCARDIOGRAM REPORT: CPT | Performed by: INTERNAL MEDICINE

## 2019-07-25 PROCEDURE — 6360000002 HC RX W HCPCS: Performed by: INTERNAL MEDICINE

## 2019-07-25 PROCEDURE — 82550 ASSAY OF CK (CPK): CPT

## 2019-07-25 PROCEDURE — 2580000003 HC RX 258: Performed by: INTERNAL MEDICINE

## 2019-07-25 PROCEDURE — G0480 DRUG TEST DEF 1-7 CLASSES: HCPCS

## 2019-07-25 RX ORDER — SODIUM CHLORIDE 0.9 % (FLUSH) 0.9 %
10 SYRINGE (ML) INJECTION PRN
Status: CANCELLED | OUTPATIENT
Start: 2019-07-25

## 2019-07-25 RX ORDER — CLONIDINE HYDROCHLORIDE 0.1 MG/1
0.1 TABLET ORAL PRN
Status: DISCONTINUED | OUTPATIENT
Start: 2019-07-25 | End: 2019-07-25 | Stop reason: HOSPADM

## 2019-07-25 RX ORDER — ACETAMINOPHEN 325 MG/1
650 TABLET ORAL EVERY 4 HOURS PRN
Status: DISCONTINUED | OUTPATIENT
Start: 2019-07-25 | End: 2019-07-25 | Stop reason: HOSPADM

## 2019-07-25 RX ORDER — BUPRENORPHINE 2 MG/1
2 TABLET SUBLINGUAL PRN
Status: DISCONTINUED | OUTPATIENT
Start: 2019-07-25 | End: 2019-07-25 | Stop reason: HOSPADM

## 2019-07-25 RX ORDER — SODIUM CHLORIDE 9 MG/ML
INJECTION, SOLUTION INTRAVENOUS CONTINUOUS
Status: DISCONTINUED | OUTPATIENT
Start: 2019-07-25 | End: 2019-07-25 | Stop reason: HOSPADM

## 2019-07-25 RX ORDER — NICOTINE 21 MG/24HR
1 PATCH, TRANSDERMAL 24 HOURS TRANSDERMAL DAILY
Status: DISCONTINUED | OUTPATIENT
Start: 2019-07-25 | End: 2019-07-25 | Stop reason: HOSPADM

## 2019-07-25 RX ADMIN — BUPRENORPHINE HCL 2 MG: 2 TABLET SUBLINGUAL at 13:39

## 2019-07-25 RX ADMIN — VANCOMYCIN HYDROCHLORIDE 1500 MG: 5 INJECTION, POWDER, LYOPHILIZED, FOR SOLUTION INTRAVENOUS at 00:00

## 2019-07-25 RX ADMIN — VANCOMYCIN HYDROCHLORIDE 1000 MG: 1 INJECTION, POWDER, LYOPHILIZED, FOR SOLUTION INTRAVENOUS at 10:04

## 2019-07-25 RX ADMIN — CLONIDINE HYDROCHLORIDE 0.1 MG: 0.1 TABLET ORAL at 13:39

## 2019-07-25 RX ADMIN — SODIUM CHLORIDE: 9 INJECTION, SOLUTION INTRAVENOUS at 13:14

## 2019-07-25 RX ADMIN — ACETAMINOPHEN 650 MG: 325 TABLET, FILM COATED ORAL at 07:34

## 2019-07-25 ASSESSMENT — PAIN DESCRIPTION - ONSET: ONSET: GRADUAL

## 2019-07-25 ASSESSMENT — PAIN DESCRIPTION - FREQUENCY: FREQUENCY: CONTINUOUS

## 2019-07-25 ASSESSMENT — PAIN SCALES - GENERAL
PAINLEVEL_OUTOF10: 9
PAINLEVEL_OUTOF10: 10
PAINLEVEL_OUTOF10: 0
PAINLEVEL_OUTOF10: 9
PAINLEVEL_OUTOF10: 9
PAINLEVEL_OUTOF10: 10

## 2019-07-25 ASSESSMENT — PAIN DESCRIPTION - PAIN TYPE
TYPE: ACUTE PAIN
TYPE: ACUTE PAIN

## 2019-07-25 ASSESSMENT — PAIN - FUNCTIONAL ASSESSMENT: PAIN_FUNCTIONAL_ASSESSMENT: ACTIVITIES ARE NOT PREVENTED

## 2019-07-25 ASSESSMENT — PAIN DESCRIPTION - LOCATION: LOCATION: MOUTH

## 2019-07-25 ASSESSMENT — PAIN DESCRIPTION - DESCRIPTORS: DESCRIPTORS: ACHING;DISCOMFORT;OTHER (COMMENT)

## 2019-07-25 ASSESSMENT — PAIN DESCRIPTION - PROGRESSION: CLINICAL_PROGRESSION: GRADUALLY WORSENING

## 2019-07-25 NOTE — ED PROVIDER NOTES
Ketones, Urine Negative Negative mg/dL    Specific Gravity, UA 1.020 1.005 - 1.030    Blood, Urine Negative Negative    pH, UA 6.0 5.0 - 9.0    Protein, UA Negative Negative mg/dL    Urobilinogen, Urine 0.2 <2.0 E.U./dL    Nitrite, Urine Negative Negative    Leukocyte Esterase, Urine Negative Negative   APTT   Result Value Ref Range    aPTT 30.7 24.5 - 35.1 sec   Protime-INR   Result Value Ref Range    Protime 10.8 9.3 - 12.4 sec    INR 1.0    EKG 12 lead   Result Value Ref Range    Ventricular Rate 52 BPM    Atrial Rate 52 BPM    P-R Interval 124 ms    QRS Duration 88 ms    Q-T Interval 418 ms    QTc Calculation (Bazett) 388 ms    P Axis 90 degrees    R Axis 76 degrees    T Axis 57 degrees       RADIOLOGY:  XR CHEST STANDARD (2 VW)   Final Result   1. No active cardiopulmonary disease. EKG: This EKG is signed and interpreted by me. Rate: 52  Rhythm: Sinus  Interpretation: no acute changes  Comparison: no previous EKG available      ------------------------- NURSING NOTES AND VITALS REVIEWED ---------------------------  Date / Time Roomed:  7/24/2019  9:56 PM  ED Bed Assignment:  10/10    The nursing notes within the ED encounter and vital signs as below have been reviewed. Patient Vitals for the past 24 hrs:   BP Temp Pulse Resp SpO2 Height Weight   07/24/19 2159 118/73 98.2 °F (36.8 °C) 65 16 98 % 5' 10\" (1.778 m) 145 lb (65.8 kg)       Oxygen Saturation Interpretation: Normal    ------------------------------------------ PROGRESS NOTES ------------------------------------------  Re-evaluation(s):  Time: 7936. Patients symptoms show no change  Repeat physical examination is not changed    Counseling:  I have spoken with the patient and discussed todays results, in addition to providing specific details for the plan of care and counseling regarding the diagnosis and prognosis.   Their questions are answered at this time and they are agreeable with the plan of admission.    --------------------------------- ADDITIONAL PROVIDER NOTES ---------------------------------  Consultations:  Time: 2325. Spoke with Dr. Gabbi Bhardwaj. Discussed case. They will admit the patient. This patient's ED course included: a personal history and physicial examination, re-evaluation prior to disposition, IV medications, cardiac monitoring and continuous pulse oximetry    This patient has remained hemodynamically stable during their ED course. Diagnosis:  1. MRSA bacteremia    2. Acute bacterial endocarditis        Disposition:  Patient's disposition: Admit to med/surg floor  Patient's condition is stable.               Felipe Keys DO  07/24/19 3591

## 2019-07-25 NOTE — CONSULTS
cardiopulmonary disease. I have personally reviewed the laboratory, cardiac diagnostic and radiographic testing as outlined above:    IMPRESSION:  1. Bacteremia: with possible vegetation visualized on mitral valve on TTE. LOGAN has been requested to confirm or deny mitral vavle vegetation in the presence of abnormal echo, + blood cultures. 2. IV drug abuse  3. Tobacco abuse: Patient counseled to stop smoking and using tobacco.  4. Noncompliance with medications and medical advice       RECOMMENDATIONS:   1. Transesophageal Echocardiogram tomorrow with Dr. Haley Amaro. Procedure, alternatives, benefits, risks including but not limited to sore throat, dental injury, pharyngeal injury, esophageal tear/rupture, side effects or allergy to medications, patient is willing to proceed, will schedule. 2. Further cardiac recommendations forthcoming pending patients clinical progression and diagnostic test findings         I have reviewed my findings and recommendations with patient  discussed with Dr. Haley Amaro     Thank you for the consult! Electronically signed by MYA Fay CNP on 7/25/2019 at 1:32 PM  I have discussed the care of patient including pertinent history and exam and reviewed chart, vitals, labs and radiologic studies. I also participated in medical decision making with Christiano Spaulding CNP on the date of service and I agree with all of the pertinent clinical information, assessment and treatment plan and status of the problem list as documented and have edited it. NOTE: This report was transcribed using voice recognition software.  Every effort was made to ensure accuracy; however, inadvertent computerized transcription errors may be present

## 2019-07-25 NOTE — PROGRESS NOTES
This nurse enters patient room to reassess for COWS. Patient immediately states that he needs his IV removed so he can leave. This nurse educates patient with zero effect. Charge nurse notified. JOSEFINA paper signed and in the chart. Patient refused to stay for discharge paper work.

## 2019-07-25 NOTE — PROGRESS NOTES
Internal Medicine Progress Note  7/25/2019     HPI:   He appears very agitated and noncooperative this morning. He has been refusing blood draws and IV access placement. His responses are pejorative. There is no family or friends present at the bedside. The patient appears to be in no acute distress. ROS: pertinent positives discussed in HPI.   Negative cough, SOB  Negative chest pain, palpitations  Negative n/v, abd pain  Negative fever, chills, HA  Negative dysuria, hematuria  Negative rash, lesions  Negative confusion, agitation  Negative back pain, gait problem  Negative eye pain, redness  Negative congestion, rhinorrhea    Physical Exam:  /66 Comment: manual  Pulse 70   Temp 98.2 °F (36.8 °C) (Oral)   Resp 16   Ht 5' 10\" (1.778 m)   Wt 145 lb (65.8 kg)   SpO2 94%   BMI 20.81 kg/m²   General appearance: alert and oriented, no acute distress, disheveled  Head: PERRL, nonicteric, atraumatic  Chest: no chest wall tenderness  Lungs: non-labored, equal chest rise, no cough, room air  Heart: Regular rate and rhythm, no pedal edema  Abdomen: S NT ND no guarding  Extremities: no swelling or edema, no calf tenderness  Neuro: CNs grossly intact, no slurred speech, A&O x3  Skin: no lesions, bleeding, skin excoriations  Musculo: normal ROM, no deformities      enoxaparin (LOVENOX) injection 40 mg Daily   acetaminophen (TYLENOL) tablet 650 mg Q4H PRN   0.9 % sodium chloride infusion Continuous         Intake/Output Summary (Last 24 hours) at 7/25/2019 0816  Last data filed at 7/25/2019 0115  Gross per 24 hour   Intake 156 ml   Output --   Net 156 ml       CBC:   Recent Labs     07/23/19 1930 07/24/19  2255   WBC 8.3 8.9   HGB 12.4* 12.4*    384     BMP: Recent Labs     07/23/19 1930 07/25/19  0003    136   K 4.9 4.2   CL 98 98   CO2 28 28   BUN 15 18   CREATININE 0.9 1.0   GLUCOSE 94 112*     INR:   Recent Labs     07/24/19  2255   INR 1.0       Assessment/Plan:   MRSA bacteremia secondary

## 2019-07-25 NOTE — CONSULTS
[address] [place] [address] [facility]  Consult Note    [address] [facility] [address]  [place], [address]  Phone [phone]   [phone]      Admit Date: 2019  9:56 PM  Pt Name: Oneil Galarza  MRN: [id number]  : 1990  Reason for Consult:    Chief Complaint   Patient presents with    Other     has endocarditis, left earlier ama     Requesting Physician:  Dexter Parson DO  PCP: MYA Hargrove NP  History Obtained From:  patient  ID consulted for Bacteremia [R78.81]  on hospital day 1  CHIEF COMPLAINT       Chief Complaint   Patient presents with    Other     has endocarditis, left earlier ama     HISTORYOF PRESENT ILLNESS      Oneil Galarza is a 34 y.o. male who presents with significant past medical history of  has no past medical history on file. who presents with   Chief Complaint   Patient presents with    Other     has endocarditis, left earlier ama     ED TRIAGEVITALS  BP: 118/66(manual), Temp: 98.2 °F (36.8 °C), Pulse: 70, Resp: 16, SpO2: 94 %  HPI  KNOWN TO ID WAS ADMITTED THEN LEFT AMA . HAD MRSA BACTEREMIA AND HIP PAIN. TTE WITH VEG ON MV    HAD SEVERAL ER VISITS ON  HAD VANCO -LEFT AMA THEN RETURNED  LEFT AMA AND NOW ADMITTED LAST PM FROM ER  CR0.9 WBC8.3 URC80  Came in to see pt. He did not want to answer my questions.     REVIEW OF SYSTEMS    (2-9 systems for level 4, 10 or more for level 5)     REVIEW OFSYSTEMS:    CONSTITUTIONAL:   No fever, chills, weight loss  ALLERGIES:    No urticaria, hay fever,    EYES:     No blurry vision, loss of vision,eye pain  ENT:      No hearing loss, sore throat  CARDIOVASCULAR:  No chest pain or palpitations  RESPIRATORY:   No cough, sob  ENDOCRINE:    No increase thirst, urination   HEME-LYMPH:   No easy bruising or bleeding  GI:     No nausea, vomiting or diarrhea  :     No urinary complaints  NEURO:    No seizures, stroke, HA  MUSCULOSKELETAL:  No muscle aches or pain, no jointpain  SKIN:     No rash or itch  PSYCH:    No depression or anxiety    CURRENT MEDICATIONS     Current Facility-Administered Medications:     enoxaparin (LOVENOX) injection 40 mg, 40 mg, Subcutaneous, Daily, Ismail U Raquel, DO    acetaminophen (TYLENOL) tablet 650 mg, 650 mg, Oral, Q4H PRN, Meseret Begun, DO, 650 mg at 07/25/19 0734    0.9 % sodium chloride infusion, , Intravenous, Continuous, Meseret Begun, DO, Stopped at 07/25/19 8268  ALLERGIES     Penicillins  Immunization History   Administered Date(s) Administered    Tdap (Boostrix, Adacel) 04/07/2013     PAST MEDICAL HISTORY   History reviewed. No pertinent past medical history.   SURGICAL HISTORY       Past Surgical History:   Procedure Laterality Date    TONSILLECTOMY       FAMILY HISTORY       Family History   Family history unknown: Yes     SOCIAL HISTORY       Social History     Socioeconomic History    Marital status: Single     Spouse name: None    Number of children: None    Years of education: None    Highest education level: None   Occupational History    None   Social Needs    Financial resource strain: None    Food insecurity:     Worry: None     Inability: None    Transportation needs:     Medical: None     Non-medical: None   Tobacco Use    Smoking status: Current Every Day Smoker     Packs/day: 1.00     Years: 10.00     Pack years: 10.00     Types: Cigarettes, Cigars    Smokeless tobacco: Current User     Types: Chew   Substance and Sexual Activity    Alcohol use: Not Currently    Drug use: Not Currently     Types: Marijuana, Opiates , IV     Comment: occasionally    Sexual activity: None   Lifestyle    Physical activity:     Days per week: None     Minutes per session: None    Stress: None   Relationships    Social connections:     Talks on phone: None     Gets together: None     Attends Quaker service: None     Active member of club or organization: None     Attends meetings of clubs or organizations: None     Relationship status: 07/16/2019     Lab Results   Component Value Date    SEDRATE 35 (H) 07/23/2019    SEDRATE 28 (H) 07/16/2019     No results found for: QGLAREG1I8  No results found for: HAV, HEPAIGM, HEPBIGM, HEPBCAB, HBEAG, HEPCAB    MICROBIOLOGY:  Cultures :   Lab Results   Component Value Date    [facility]  07/16/2019     Methicillin resistant Staph aureus isolated. Most Methicillin  resistant Staphylococcus are usually resistant to multiple  antibiotics including other B-Lactams, Aminoglycosides,  Macrolides, Clindamycin and Tetracycline. Contact isolation  is indicated. [facility]  07/14/2019     Methicillin resistant Staph aureus isolated. Most Methicillin  resistant Staphylococcus are usually resistant to multiple  antibiotics including other B-Lactams, Aminoglycosides,  Macrolides, Clindamycin and Tetracycline. Contact isolation  is indicated.       BC 5 Days- no growth 10/07/2017    ORG Staphylococcus aureus 07/16/2019    ORG Staphylococcus aureus 07/16/2019    ORG Klebsiella pneumoniae ssp pneumoniae 07/14/2019     Lab Results   Component Value Date    [facility]  07/16/2019     Refer to previous blood culture collected  7/16/19 @ 2200 for susceptibility results      [facility] Previously positive blood culture called 07/14/2019    [id number]  07/14/2019     Refer to previous blood culture (RFA) collected  7/14/19 @ 2050 for susceptibility results      ORG Staphylococcus aureus 07/16/2019    ORG Staphylococcus aureus 07/16/2019    ORG Klebsiella pneumoniae ssp pneumoniae 07/14/2019       WOUND/ABSCESS   Date Value Ref Range Status   10/07/2017 Moderate growth  Final      URINE CULTURE  Urine Culture, Routine   Date Value Ref Range Status   07/16/2019 <10,000 CFU/mL  Mixed gram positive organisms    Final   07/14/2019 <10,000 CFU/mL  Gram positive organism    Final        Patient is a 34 y.o. male who presented with   Chief Complaint   Patient presents with    Other     has endocarditis, left earlier ama        FINAL IMPRESSION

## 2019-07-26 ENCOUNTER — ANESTHESIA (OUTPATIENT)
Dept: OPERATING ROOM | Age: 29
End: 2019-07-26

## 2019-07-26 ENCOUNTER — ANESTHESIA EVENT (OUTPATIENT)
Dept: OPERATING ROOM | Age: 29
End: 2019-07-26

## 2019-07-26 ASSESSMENT — LIFESTYLE VARIABLES: SMOKING_STATUS: 1

## 2019-07-26 NOTE — DISCHARGE SUMMARY
The patient presented to [address] [place] [address] emergency department for the treatment of bacteremia in the setting of IV drug abuse and concern for mitral valve vegetation. The patient was evaluated by the infectious disease and cardiovascular teams. IV antibiotic therapy was employed and plans were for transesophageal echocardiogram yesterday afternoon. Unfortunately, the patient decided to leave [address] This is the third time the patient has presented to the hospital and left [address] His overall long-term prognosis is extremely poor. We discussed the gravity of the situation and the life-threatening situation if he were to leave [address] Despite our conversation and opioid withdrawal treatment protocol, the patient opted to remove his IV site and leave [address] Again, I cannot reiterate enough as I did to the patient, his long-term prognosis is poor and there is the real possibility of death in the near future. As the patient was admitted to the hospital for less than 24 hours, this will act as the discharge summary.     Gerard Dunbar  12:00 PM  7/26/2019

## 2019-07-29 LAB
6AM URINE: 66 NG/ML
BLOOD CULTURE, ROUTINE: NORMAL
COCAINE, CONFIRM, URINE: 710 NG/ML
CODEINE, URINE: <20 NG/ML
CULTURE, BLOOD 2: NORMAL
HYDROCODONE, URINE: <20 NG/ML
HYDROMORPHONE, URINE: <20 NG/ML
MORPHINE URINE: 365 NG/ML
NORHYDROCODONE, URINE: <20 NG/ML
NOROXYCODONE, URINE: <20 NG/ML
NOROXYMORPHONE, URINE: <20 NG/ML
OXYCODONE, URINE CONFIRMATION: <20 NG/ML
OXYMORPHONE, URINE: <20 NG/ML

## 2019-07-30 LAB
BLOOD CULTURE, ROUTINE: NORMAL
CULTURE, BLOOD 2: NORMAL

## 2020-08-16 ENCOUNTER — APPOINTMENT (OUTPATIENT)
Dept: GENERAL RADIOLOGY | Age: 30
End: 2020-08-16
Payer: MEDICARE

## 2020-08-16 ENCOUNTER — HOSPITAL ENCOUNTER (EMERGENCY)
Age: 30
Discharge: HOME | End: 2020-08-16
Attending: EMERGENCY MEDICINE
Payer: MEDICARE

## 2020-08-16 VITALS
BODY MASS INDEX: 23.1 KG/M2 | HEART RATE: 98 BPM | WEIGHT: 165 LBS | RESPIRATION RATE: 16 BRPM | OXYGEN SATURATION: 100 % | DIASTOLIC BLOOD PRESSURE: 80 MMHG | SYSTOLIC BLOOD PRESSURE: 125 MMHG | TEMPERATURE: 98 F | HEIGHT: 71 IN

## 2020-08-16 PROCEDURE — 73120 X-RAY EXAM OF HAND: CPT

## 2020-08-16 PROCEDURE — 90471 IMMUNIZATION ADMIN: CPT | Performed by: STUDENT IN AN ORGANIZED HEALTH CARE EDUCATION/TRAINING PROGRAM

## 2020-08-16 PROCEDURE — 99283 EMERGENCY DEPT VISIT LOW MDM: CPT

## 2020-08-16 PROCEDURE — 87040 BLOOD CULTURE FOR BACTERIA: CPT

## 2020-08-16 PROCEDURE — 90715 TDAP VACCINE 7 YRS/> IM: CPT | Performed by: STUDENT IN AN ORGANIZED HEALTH CARE EDUCATION/TRAINING PROGRAM

## 2020-08-16 PROCEDURE — 6360000002 HC RX W HCPCS: Performed by: STUDENT IN AN ORGANIZED HEALTH CARE EDUCATION/TRAINING PROGRAM

## 2020-08-16 PROCEDURE — 73620 X-RAY EXAM OF FOOT: CPT

## 2020-08-16 RX ORDER — CLINDAMYCIN HYDROCHLORIDE 300 MG/1
300 CAPSULE ORAL 4 TIMES DAILY
Qty: 40 CAPSULE | Refills: 0 | Status: SHIPPED | OUTPATIENT
Start: 2020-08-16 | End: 2020-08-26

## 2020-08-16 RX ADMIN — TETANUS TOXOID, REDUCED DIPHTHERIA TOXOID AND ACELLULAR PERTUSSIS VACCINE, ADSORBED 0.5 ML: 5; 2.5; 8; 8; 2.5 SUSPENSION INTRAMUSCULAR at 02:02

## 2020-08-16 ASSESSMENT — PAIN SCALES - GENERAL: PAINLEVEL_OUTOF10: 10

## 2020-08-16 ASSESSMENT — ENCOUNTER SYMPTOMS
COUGH: 0
RHINORRHEA: 0
BACK PAIN: 0
VOMITING: 0
SHORTNESS OF BREATH: 0
NAUSEA: 0
SORE THROAT: 0
ABDOMINAL PAIN: 0
DIARRHEA: 0

## 2020-08-16 ASSESSMENT — PAIN DESCRIPTION - PAIN TYPE: TYPE: ACUTE PAIN

## 2020-08-16 ASSESSMENT — PAIN DESCRIPTION - FREQUENCY: FREQUENCY: CONTINUOUS

## 2020-08-16 NOTE — ED PROVIDER NOTES
Rosa Maria Ruby is a 31 y.o. male with a PMHx significant for tobacco abuse who presents for evaluation of left foot pain and dental pain, beginning prior to arrival.  The complaint has been intermittent, moderate in severity, and worsened by nothing. The patient states that he was working earlier today when he stepped on a nail at work. He states the pain has continued throughout the day. He also feels as if he has a dental infection in his tooth. Patient states that his last Tetanus was greater than 20 years ago. When asked why he waited so long to be evaluated for the nail in his foot earlier, he states well I just got arrested. The history is provided by the patient and medical records. Review of Systems   Constitutional: Negative for chills, diaphoresis and fever. HENT: Positive for dental problem. Negative for congestion, rhinorrhea and sore throat. Eyes: Negative for visual disturbance. Respiratory: Negative for cough and shortness of breath. Cardiovascular: Negative for chest pain. Gastrointestinal: Negative for abdominal pain, diarrhea, nausea and vomiting. Genitourinary: Negative for dysuria and urgency. Musculoskeletal: Negative for back pain. Foot pain  Left wrist pain     Skin: Negative for rash. Neurological: Negative for dizziness, light-headedness, numbness and headaches. Physical Exam  Vitals signs and nursing note reviewed. Constitutional:       General: He is not in acute distress. Appearance: Normal appearance. He is well-developed. He is not ill-appearing. HENT:      Head: Normocephalic and atraumatic. Right Ear: External ear normal.      Left Ear: External ear normal.      Mouth/Throat:      Dentition: Abnormal dentition. Dental caries present. No dental abscesses. Eyes:      General:         Right eye: No discharge. Left eye: No discharge. Extraocular Movements: Extraocular movements intact.       Conjunctiva/sclera: History:  has a past surgical history that includes Tonsillectomy. Social History:  reports that he has been smoking cigarettes and cigars. He has a 10.00 pack-year smoking history. His smokeless tobacco use includes chew. He reports previous alcohol use. He reports previous drug use. Drugs: Marijuana, Opiates , and IV. Family History: Family history is unknown by patient. The patients home medications have been reviewed. Allergies: Penicillins    -------------------------------------------------- RESULTS -------------------------------------------------  Labs:  No results found for this visit on 08/16/20. Radiology:  XR HAND LEFT (2 VIEWS)   Final Result   No acute osseous abnormality. XR FOOT RIGHT (2 VIEWS)   Final Result   No fracture or radiopaque foreign body. ------------------------- NURSING NOTES AND VITALS REVIEWED ---------------------------  Date / Time Roomed:  8/16/2020  1:39 AM  ED Bed Assignment:  10/10    The nursing notes within the ED encounter and vital signs as below have been reviewed. /80   Pulse 98   Temp 98 °F (36.7 °C) (Oral)   Resp 16   Ht 5' 11\" (1.803 m)   Wt 165 lb (74.8 kg)   SpO2 100%   BMI 23.01 kg/m²   Oxygen Saturation Interpretation: Normal      ------------------------------------------ PROGRESS NOTES ------------------------------------------  5:46 AM EDT  I have spoken with the patient and discussed todays results, in addition to providing specific details for the plan of care and counseling regarding the diagnosis and prognosis. Their questions are answered at this time and they are agreeable with the plan. I discussed at length with them reasons for immediate return here for re evaluation.  They will followup with their primary care physician by calling their office on Monday.      --------------------------------- ADDITIONAL PROVIDER NOTES ---------------------------------  At this time the patient is without objective evidence of an acute process requiring hospitalization or inpatient management. They have remained hemodynamically stable throughout their entire ED visit and are stable for discharge with outpatient follow-up. The plan has been discussed in detail and they are aware of the specific conditions for emergent return, as well as the importance of follow-up. There are no discharge medications for this patient. Diagnosis:  1. Infected dental carries    2. Right foot pain    3. Left wrist pain        Disposition:  Patient's disposition: Discharge to alf  Patient's condition is stable.            Tony Goddard DO  Resident  08/16/20 9093

## 2020-08-21 LAB — BLOOD CULTURE, ROUTINE: NORMAL

## 2020-11-04 ENCOUNTER — HOSPITAL ENCOUNTER (INPATIENT)
Age: 30
LOS: 2 days | Discharge: HOME | DRG: 364 | End: 2020-11-06
Attending: EMERGENCY MEDICINE | Admitting: INTERNAL MEDICINE
Payer: MEDICARE

## 2020-11-04 ENCOUNTER — APPOINTMENT (OUTPATIENT)
Dept: ULTRASOUND IMAGING | Age: 30
DRG: 364 | End: 2020-11-04
Payer: MEDICARE

## 2020-11-04 ENCOUNTER — APPOINTMENT (OUTPATIENT)
Dept: GENERAL RADIOLOGY | Age: 30
DRG: 364 | End: 2020-11-04
Payer: MEDICARE

## 2020-11-04 PROBLEM — L03.114 CELLULITIS OF LEFT HAND: Status: ACTIVE | Noted: 2020-11-04

## 2020-11-04 PROBLEM — F19.10 DRUG ABUSE (HCC): Status: ACTIVE | Noted: 2020-11-04

## 2020-11-04 LAB
ALBUMIN SERPL-MCNC: 4.2 G/DL (ref 3.5–5.2)
ALP BLD-CCNC: 74 U/L (ref 40–129)
ALT SERPL-CCNC: 18 U/L (ref 0–40)
ANION GAP SERPL CALCULATED.3IONS-SCNC: 10 MMOL/L (ref 7–16)
ANION GAP SERPL CALCULATED.3IONS-SCNC: 14 MMOL/L (ref 7–16)
AST SERPL-CCNC: 19 U/L (ref 0–39)
BASOPHILS ABSOLUTE: 0.09 E9/L (ref 0–0.2)
BASOPHILS RELATIVE PERCENT: 0.9 % (ref 0–2)
BILIRUB SERPL-MCNC: 0.5 MG/DL (ref 0–1.2)
BUN BLDV-MCNC: 11 MG/DL (ref 6–20)
BUN BLDV-MCNC: 9 MG/DL (ref 6–20)
C-REACTIVE PROTEIN: 5.8 MG/DL (ref 0–0.4)
CALCIUM SERPL-MCNC: 9 MG/DL (ref 8.6–10.2)
CALCIUM SERPL-MCNC: 9.1 MG/DL (ref 8.6–10.2)
CHLORIDE BLD-SCNC: 96 MMOL/L (ref 98–107)
CHLORIDE BLD-SCNC: 97 MMOL/L (ref 98–107)
CO2: 22 MMOL/L (ref 22–29)
CO2: 31 MMOL/L (ref 22–29)
CREAT SERPL-MCNC: 0.9 MG/DL (ref 0.7–1.2)
CREAT SERPL-MCNC: 1 MG/DL (ref 0.7–1.2)
EOSINOPHILS ABSOLUTE: 0.34 E9/L (ref 0.05–0.5)
EOSINOPHILS RELATIVE PERCENT: 3.5 % (ref 0–6)
GFR AFRICAN AMERICAN: >60
GFR AFRICAN AMERICAN: >60
GFR NON-AFRICAN AMERICAN: >60 ML/MIN/1.73
GFR NON-AFRICAN AMERICAN: >60 ML/MIN/1.73
GLUCOSE BLD-MCNC: 171 MG/DL (ref 74–99)
GLUCOSE BLD-MCNC: 97 MG/DL (ref 74–99)
HCT VFR BLD CALC: 46.6 % (ref 37–54)
HEMOGLOBIN: 16 G/DL (ref 12.5–16.5)
LACTIC ACID: 1.6 MMOL/L (ref 0.5–2.2)
LYMPHOCYTES ABSOLUTE: 1.47 E9/L (ref 1.5–4)
LYMPHOCYTES RELATIVE PERCENT: 14.8 % (ref 20–42)
MCH RBC QN AUTO: 30.8 PG (ref 26–35)
MCHC RBC AUTO-ENTMCNC: 34.3 % (ref 32–34.5)
MCV RBC AUTO: 89.8 FL (ref 80–99.9)
MONOCYTES ABSOLUTE: 0.39 E9/L (ref 0.1–0.95)
MONOCYTES RELATIVE PERCENT: 4.3 % (ref 2–12)
NEUTROPHILS ABSOLUTE: 7.55 E9/L (ref 1.8–7.3)
NEUTROPHILS RELATIVE PERCENT: 76.5 % (ref 43–80)
PDW BLD-RTO: 13.1 FL (ref 11.5–15)
PLATELET # BLD: 264 E9/L (ref 130–450)
PMV BLD AUTO: 9.6 FL (ref 7–12)
POTASSIUM SERPL-SCNC: 3.7 MMOL/L (ref 3.5–5)
POTASSIUM SERPL-SCNC: 4.1 MMOL/L (ref 3.5–5)
RBC # BLD: 5.19 E12/L (ref 3.8–5.8)
REASON FOR REJECTION: NORMAL
REJECTED TEST: NORMAL
SODIUM BLD-SCNC: 133 MMOL/L (ref 132–146)
SODIUM BLD-SCNC: 137 MMOL/L (ref 132–146)
TOTAL PROTEIN: 7.7 G/DL (ref 6.4–8.3)
WBC # BLD: 9.8 E9/L (ref 4.5–11.5)

## 2020-11-04 PROCEDURE — 6360000002 HC RX W HCPCS: Performed by: NURSE PRACTITIONER

## 2020-11-04 PROCEDURE — 2580000003 HC RX 258: Performed by: INTERNAL MEDICINE

## 2020-11-04 PROCEDURE — 83605 ASSAY OF LACTIC ACID: CPT

## 2020-11-04 PROCEDURE — 76882 US LMTD JT/FCL EVL NVASC XTR: CPT

## 2020-11-04 PROCEDURE — 6370000000 HC RX 637 (ALT 250 FOR IP): Performed by: PHYSICIAN ASSISTANT

## 2020-11-04 PROCEDURE — 6360000002 HC RX W HCPCS: Performed by: PHYSICIAN ASSISTANT

## 2020-11-04 PROCEDURE — 80053 COMPREHEN METABOLIC PANEL: CPT

## 2020-11-04 PROCEDURE — 6360000002 HC RX W HCPCS: Performed by: INTERNAL MEDICINE

## 2020-11-04 PROCEDURE — 96374 THER/PROPH/DIAG INJ IV PUSH: CPT

## 2020-11-04 PROCEDURE — 99284 EMERGENCY DEPT VISIT MOD MDM: CPT

## 2020-11-04 PROCEDURE — 2580000003 HC RX 258: Performed by: PHYSICIAN ASSISTANT

## 2020-11-04 PROCEDURE — 1200000000 HC SEMI PRIVATE

## 2020-11-04 PROCEDURE — 2580000003 HC RX 258: Performed by: NURSE PRACTITIONER

## 2020-11-04 PROCEDURE — 73130 X-RAY EXAM OF HAND: CPT

## 2020-11-04 PROCEDURE — 6360000002 HC RX W HCPCS: Performed by: STUDENT IN AN ORGANIZED HEALTH CARE EDUCATION/TRAINING PROGRAM

## 2020-11-04 PROCEDURE — 80048 BASIC METABOLIC PNL TOTAL CA: CPT

## 2020-11-04 PROCEDURE — 85025 COMPLETE CBC W/AUTO DIFF WBC: CPT

## 2020-11-04 PROCEDURE — 87040 BLOOD CULTURE FOR BACTERIA: CPT

## 2020-11-04 PROCEDURE — 6370000000 HC RX 637 (ALT 250 FOR IP): Performed by: INTERNAL MEDICINE

## 2020-11-04 PROCEDURE — 87070 CULTURE OTHR SPECIMN AEROBIC: CPT

## 2020-11-04 PROCEDURE — APPSS45 APP SPLIT SHARED TIME 31-45 MINUTES: Performed by: NURSE PRACTITIONER

## 2020-11-04 PROCEDURE — 36415 COLL VENOUS BLD VENIPUNCTURE: CPT

## 2020-11-04 PROCEDURE — 86140 C-REACTIVE PROTEIN: CPT

## 2020-11-04 PROCEDURE — 0J9K0ZZ DRAINAGE OF LEFT HAND SUBCUTANEOUS TISSUE AND FASCIA, OPEN APPROACH: ICD-10-PCS | Performed by: ORTHOPAEDIC SURGERY

## 2020-11-04 PROCEDURE — 99223 1ST HOSP IP/OBS HIGH 75: CPT | Performed by: INTERNAL MEDICINE

## 2020-11-04 RX ORDER — ACETAMINOPHEN 650 MG/1
650 SUPPOSITORY RECTAL EVERY 6 HOURS PRN
Status: DISCONTINUED | OUTPATIENT
Start: 2020-11-04 | End: 2020-11-06 | Stop reason: HOSPADM

## 2020-11-04 RX ORDER — NICOTINE 21 MG/24HR
1 PATCH, TRANSDERMAL 24 HOURS TRANSDERMAL DAILY
Status: DISCONTINUED | OUTPATIENT
Start: 2020-11-04 | End: 2020-11-06 | Stop reason: HOSPADM

## 2020-11-04 RX ORDER — SODIUM CHLORIDE 0.9 % (FLUSH) 0.9 %
10 SYRINGE (ML) INJECTION EVERY 12 HOURS SCHEDULED
Status: DISCONTINUED | OUTPATIENT
Start: 2020-11-04 | End: 2020-11-06 | Stop reason: HOSPADM

## 2020-11-04 RX ORDER — ACETAMINOPHEN 325 MG/1
650 TABLET ORAL EVERY 6 HOURS PRN
Status: DISCONTINUED | OUTPATIENT
Start: 2020-11-04 | End: 2020-11-06 | Stop reason: HOSPADM

## 2020-11-04 RX ORDER — OXYCODONE HYDROCHLORIDE AND ACETAMINOPHEN 5; 325 MG/1; MG/1
1 TABLET ORAL EVERY 4 HOURS PRN
Status: DISCONTINUED | OUTPATIENT
Start: 2020-11-04 | End: 2020-11-06 | Stop reason: HOSPADM

## 2020-11-04 RX ORDER — ACETAMINOPHEN 500 MG
1000 TABLET ORAL ONCE
Status: COMPLETED | OUTPATIENT
Start: 2020-11-04 | End: 2020-11-04

## 2020-11-04 RX ORDER — PROMETHAZINE HYDROCHLORIDE 25 MG/1
12.5 TABLET ORAL EVERY 6 HOURS PRN
Status: DISCONTINUED | OUTPATIENT
Start: 2020-11-04 | End: 2020-11-06 | Stop reason: HOSPADM

## 2020-11-04 RX ORDER — 0.9 % SODIUM CHLORIDE 0.9 %
30 INTRAVENOUS SOLUTION INTRAVENOUS ONCE
Status: COMPLETED | OUTPATIENT
Start: 2020-11-04 | End: 2020-11-04

## 2020-11-04 RX ORDER — SODIUM CHLORIDE 0.9 % (FLUSH) 0.9 %
10 SYRINGE (ML) INJECTION PRN
Status: DISCONTINUED | OUTPATIENT
Start: 2020-11-04 | End: 2020-11-06 | Stop reason: HOSPADM

## 2020-11-04 RX ORDER — POLYETHYLENE GLYCOL 3350 17 G/17G
17 POWDER, FOR SOLUTION ORAL DAILY PRN
Status: DISCONTINUED | OUTPATIENT
Start: 2020-11-04 | End: 2020-11-06 | Stop reason: HOSPADM

## 2020-11-04 RX ORDER — KETOROLAC TROMETHAMINE 30 MG/ML
30 INJECTION, SOLUTION INTRAMUSCULAR; INTRAVENOUS ONCE
Status: COMPLETED | OUTPATIENT
Start: 2020-11-04 | End: 2020-11-04

## 2020-11-04 RX ORDER — ONDANSETRON 2 MG/ML
4 INJECTION INTRAMUSCULAR; INTRAVENOUS EVERY 6 HOURS PRN
Status: DISCONTINUED | OUTPATIENT
Start: 2020-11-04 | End: 2020-11-06 | Stop reason: HOSPADM

## 2020-11-04 RX ORDER — DIPHENHYDRAMINE HYDROCHLORIDE 50 MG/ML
50 INJECTION INTRAMUSCULAR; INTRAVENOUS ONCE
Status: COMPLETED | OUTPATIENT
Start: 2020-11-04 | End: 2020-11-04

## 2020-11-04 RX ADMIN — VANCOMYCIN HYDROCHLORIDE 1250 MG: 10 INJECTION, POWDER, LYOPHILIZED, FOR SOLUTION INTRAVENOUS at 22:23

## 2020-11-04 RX ADMIN — VANCOMYCIN HYDROCHLORIDE 1250 MG: 10 INJECTION, POWDER, LYOPHILIZED, FOR SOLUTION INTRAVENOUS at 14:49

## 2020-11-04 RX ADMIN — Medication 10 ML: at 20:21

## 2020-11-04 RX ADMIN — SODIUM CHLORIDE 2508 ML: 9 INJECTION, SOLUTION INTRAVENOUS at 15:00

## 2020-11-04 RX ADMIN — KETOROLAC TROMETHAMINE 30 MG: 30 INJECTION, SOLUTION INTRAMUSCULAR at 20:09

## 2020-11-04 RX ADMIN — OXYCODONE HYDROCHLORIDE AND ACETAMINOPHEN 1 TABLET: 5; 325 TABLET ORAL at 18:23

## 2020-11-04 RX ADMIN — DIPHENHYDRAMINE HYDROCHLORIDE 50 MG: 50 INJECTION, SOLUTION INTRAMUSCULAR; INTRAVENOUS at 15:45

## 2020-11-04 RX ADMIN — WATER 1 G: 1 INJECTION INTRAMUSCULAR; INTRAVENOUS; SUBCUTANEOUS at 14:45

## 2020-11-04 RX ADMIN — ACETAMINOPHEN 1000 MG: 500 TABLET, FILM COATED ORAL at 13:44

## 2020-11-04 RX ADMIN — WATER 1 G: 1 INJECTION INTRAMUSCULAR; INTRAVENOUS; SUBCUTANEOUS at 20:09

## 2020-11-04 ASSESSMENT — PAIN SCALES - GENERAL
PAINLEVEL_OUTOF10: 9
PAINLEVEL_OUTOF10: 9
PAINLEVEL_OUTOF10: 6
PAINLEVEL_OUTOF10: 8
PAINLEVEL_OUTOF10: 9

## 2020-11-04 ASSESSMENT — PAIN DESCRIPTION - FREQUENCY: FREQUENCY: CONTINUOUS

## 2020-11-04 ASSESSMENT — PAIN DESCRIPTION - PROGRESSION: CLINICAL_PROGRESSION: GRADUALLY WORSENING

## 2020-11-04 ASSESSMENT — PAIN DESCRIPTION - ONSET: ONSET: ON-GOING

## 2020-11-04 ASSESSMENT — PAIN DESCRIPTION - LOCATION
LOCATION: HAND
LOCATION: HAND

## 2020-11-04 ASSESSMENT — PAIN DESCRIPTION - DESCRIPTORS
DESCRIPTORS: CONSTANT
DESCRIPTORS: ACHING;BURNING;DISCOMFORT

## 2020-11-04 ASSESSMENT — PAIN DESCRIPTION - ORIENTATION
ORIENTATION: LEFT
ORIENTATION: LEFT

## 2020-11-04 ASSESSMENT — PAIN DESCRIPTION - PAIN TYPE
TYPE: ACUTE PAIN
TYPE: ACUTE PAIN

## 2020-11-04 NOTE — H&P
[address] [place], [place] [address]ist Group   History and Physical      CHIEF COMPLAINT:  Left hand pain, erythema, and edema    History of Present Illness:  31 y.o. male with a history of IV drug use and bacteremia presents with left hand edema and erythema. He reports that about a week ago he attempted to remove a broken end of a needed that was lodged in the dorsum of his left hand. He said that a needle broke off about 3 months ago. He and a friend removed the needle with a razor blade and a pair of pliers. He said that the needle was brand new from the package but the pliers were not cleaned prior to removal of the needle. He denies any current IV drug use and said he has been clean from IV drug use for 82 days. He did admit to using ectasy a couple of days ago. He was admitted 10/7/17-10/18/17 for a right arm abscess and eloped. He had an Emergency Department visit on 7/14/19 for syncope. Blood cultures were sent on 7/14/19 were positive for Klebsiella pneumoniae and Staphylococcus aureus. He was called and asked to return to the hospital and was admitted from 7/16/19 to 7/17/19 and again eloped. Blood cultures on that admission sent on 7/16/19 were positive for Staphylococcus aureus  He was called back by the admitting service at that time to inform him that he had a suspected growth on his mitral valve seen on an Echocardiogram and he did return for admission but left against medical advice that evening. He presented to the Emergency Department on 7/23/19 for right hip pain but signed out against medical advice. He returned in the afternoon on 7/24/19 but again eloped only to return later that evening. He was admitted with consults to ID and Cardiology for a LOGAN, but eloped once again. An xray of the left hand was done showing soft tissue edema. Orthopedic Surgery was consulted.   An ultrasound was done revealing a complex mass concerning for abscess located within the dorsal soft tissue overlying the wrist. He was given IV Vancomycin and Ancef in the Emergency Department. Informant(s) for H&P: Patient    REVIEW OF SYSTEMS:  no fevers, chills, cp, sob, n/v, ha, vision/hearing changes, wt changes, hot/cold flashes, other open skin lesions, diarrhea, constipation, dysuria/hematuria unless noted in HPI. Complete ROS performed with the patient and is otherwise negative. PMH:  History reviewed. No pertinent past medical history. Surgical History:  Past Surgical History:   Procedure Laterality Date    TONSILLECTOMY         Medications Prior to Admission:    Prior to Admission medications    Not on File       Allergies:    Penicillins and Vancomycin    Social History:    reports that he has been smoking cigarettes and cigars. He has a 10.00 pack-year smoking history. His smokeless tobacco use includes chew. He reports previous alcohol use. He reports previous drug use. Drugs: Marijuana, Opiates , and IV. Family History:   family history includes Breast Cancer in his maternal grandmother; Diabetes in his paternal grandfather.      PHYSICAL EXAM:  Vitals:  /66   Pulse 81   Temp 98.4 °F (36.9 °C) (Oral)   Resp 16   Ht 5' 11\" (1.803 m)   Wt 184 lb 4 oz (83.6 kg)   SpO2 99%   BMI 25.70 kg/m²     General Appearance: alert and oriented to person, place and time and in no acute distress  Skin: warm and dry, left hand erythematous and edematous  Head: normocephalic and atraumatic  Eyes: pupils equal, round, and reactive to light, extraocular eye movements intact, conjunctivae normal  Mouth:  Dental caries  Neck: neck supple and non tender without mass   Pulmonary/Chest: clear to auscultation bilaterally- no wheezes, rales or rhonchi, no respiratory distress  Cardiovascular: normal rate, normal S1 and S2, murmur  Abdomen: soft, non-tender, non-distended, normal bowel sounds, no masses or organomegaly  Extremities: no cyanosis, no clubbing and 3+ edema left hand with erythema  Musculoskeletal:  Limited movement of left hand  Neurologic: no cranial nerve deficit and speech normal    LABS:  Recent Labs     11/04/20  1500      K 4.1   CL 97*   CO2 22   BUN 9   CREATININE 0.9   GLUCOSE 77   CALCIUM 9.1       Recent Labs     11/04/20  1352   WBC 9.8   RBC 5.19   HGB 16.0   HCT 46.6   MCV 89.8   MCH 30.8   MCHC 34.3   RDW 13.1      MPV 9.6       No results for input(s): POCGLU in the last 72 hours. Radiology: Xr Hand Left (min 3 Views)    Result Date: 11/4/2020  EXAMINATION: THREE XRAY VIEWS OF THE LEFT HAND 11/4/2020 2:41 pm COMPARISON: None. HISTORY: ORDERING SYSTEM PROVIDED HISTORY: infection dorsum of hand TECHNOLOGIST PROVIDED HISTORY: Reason for exam:->infection dorsum of hand FINDINGS: Severe soft tissue swelling dorsally. No acute fracture, dislocation, or bony destructive process. No gas collection or foreign body. Soft tissue edema with no acute osseous abnormality. EKG: ordered    ASSESSMENT:      Principal Problem:    Cellulitis of left hand  Active Problems:    Tobacco abuse    Drug abuse ([facility] [address]  Resolved Problems:    * No resolved hospital problems. *      PLAN:    1. Cellulitis of left hand:  Xray with soft tissue edema. Ultrasound with complex mass concerning for abscess located within dorsal soft tissue overlying the wrist.  Orthopedic Surgery following. Continue IV Vancomycin and Ancef. Consult ID. 2. Tobacco abuse:  Smoking cessation education. Nicotine patch. 3. Drug abuse:  Patient reports he has been cleaned from IV drug use for 82 days. He did admit to using Ectasy a few days ago. Check urine drug screen. 4. Hx of bacteremia:  Check Echo, blood cultures pending. Code Status: Full  DVT prophylaxis: Lovenox    NOTE: This report was transcribed using voice recognition software.  Every effort was made to ensure accuracy; however, inadvertent computerized transcription errors may be present.     Electronically signed by MYA Moody - CNP on 11/4/2020 at 5:20 PM

## 2020-11-04 NOTE — PROGRESS NOTES
Pharmacy Consultation Note  (Antibiotic Dosing and Monitoring)    Initial consult date: 2020  Consulting physician: Lilly Grigsby  Drug: Vancomycin  Indication: Cellulitis    Age/  Gender Height Weight IBW Dosing weight  Allergy Information   30 y.o./male 5' 11\" (180.3 cm) 184 lb 4 oz (83.6 kg)     Ideal body weight: 75.3 kg (166 lb 0.1 oz)  Adjusted ideal body weight: 78.6 kg (173 lb 4.9 oz)  836. kg  Penicillins      Temp (24hrs), Av.7 °F (37.1 °C), Min:98.1 °F (36.7 °C), Max:99.2 °F (37.3 °C)          Date  WBC BUN SCr CrCl  (mL/min) Drug/Dose Time   Given Level(s)   (Time) Comments    9.8 9 0.9  130   Vancomycin 1250 mg IV                                            No intake or output data in the 24 hours ending 20 3227    Historical Cultures:  Organism   Date Value Ref Range Status   2019 Staphylococcus aureus (A)  Final   2019 Staphylococcus aureus (A)  Final     No results for input(s): BC in the last 72 hours. Cultures:  available culture and sensitivity results were reviewed in [facility]    Assessment:  · 31 y.o. male has been initiated Vancomycin.   · Estimated CrCl = 130 mL/min  · Goal trough level = 15-20 mcg/mL    Plan:  · Will initiate vancomycin at a dose of 1250 q8h  · Monitor renal function   · Clinical pharmacy to follow      ALE Leonardo [facility] 2020 6:59 PM

## 2020-11-04 NOTE — CONSULTS
Department of Orthopedic Surgery  Resident Consult Note    Reason for Consult: Left hand pain and swelling    HISTORY OF PRESENT ILLNESS:       Patient is a 31 y.o. male who presents with complaint of left hand pain and swelling. He states that approximately 3 months ago he was using IV drugs and the needle broke off into that hand. He denies experiencing any issues with the needle present in the hand. 1 week prior to the day he decided to remove the needle using a razor blade. He states that he is able successfully remove the needle however he has had developing pain and swelling to the dorsal aspect of the hand. Initially he noted pain and swelling immediately in the area where he extract the needle however it has expanded to involve the dorsum of the hand. He has had difficulty moving his hand secondary to pain. He denies any current IV drug use. He denies any fever, chills, nausea, vomiting. He admits to some paresthesias to the fingers, mostly the small finger. He denies any other orthopedic complaints at this time. Past Medical History:    History reviewed. No pertinent past medical history. Past Surgical History:        Procedure Laterality Date    TONSILLECTOMY       Current Medications:   Current Facility-Administered Medications: 0.9 % sodium chloride bolus, 30 mL/kg, Intravenous, Once  vancomycin (VANCOCIN) 1,250 mg in dextrose 5 % 250 mL IVPB, 15 mg/kg, Intravenous, Once  Allergies:  Penicillins    Social History:   TOBACCO:   reports that he has been smoking cigarettes and cigars. He has a 10.00 pack-year smoking history. His smokeless tobacco use includes chew. ETOH:   reports previous alcohol use. DRUGS:   reports previous drug use. Drugs: Marijuana, Opiates , and IV.   ACTIVITIES OF DAILY LIVING:    OCCUPATION:    Family History:       Family history unknown: Yes       REVIEW OF SYSTEMS:  CONSTITUTIONAL:  negative for  fevers, chills  EYES:  negative for blurred vision, visual disturbance  HEENT:  negative for  hearing loss, voice change  RESPIRATORY:  negative for  dyspnea, wheezing  CARDIOVASCULAR:  negative for  chest pain, palpitations  GASTROINTESTINAL:  negative for nausea, vomiting  GENITOURINARY:  negative for frequency, urinary incontinence  HEMATOLOGIC/LYMPHATIC:  negative for bleeding and petechiae  MUSCULOSKELETAL:  See HPI   NEUROLOGICAL:  negative for headaches, dizziness  BEHAVIOR/PSYCH:  negative for increased agitation and anxiety    PHYSICAL EXAM:    VITALS:  BP (!) 142/70   Pulse 108   Temp 99.2 °F (37.3 °C) (Oral)   Resp 14   Ht 5' 11\" (1.803 m)   Wt 184 lb 4 oz (83.6 kg)   BMI 25.70 kg/m²   CONSTITUTIONAL:  awake, alert, cooperative, no apparent distress, and appears stated age  MUSCULOSKELETAL:  Left upper Extremity:  · Examination of the left hand reveals abscess just distal to the wrist  · Erythema and warmth is noted involving the entire dorsal aspect of the hand, this does not extend proximal to the wrist  · Patient has tenderness to palpation most notable about the site of the abscess, less so in the surrounding area  · Some tenderness to palpation about the dorsal aspect of the forearm  · Sensation intact to the ulnar and radial aspect of each digit with some paresthesia to the small finger  · Motor function is intact in AIN, PIN, radial, ulnar, and median nerve distributions  · Radial pulse +2/4, good capillary refill to the digits  · Patient is unable to make a composite fist secondary to pain and swelling  · Compartments are soft and compressible    Secondary Exam:   · rightUE: No obvious signs of trauma. -TTP to fingers, hand, wrist, forearm, elbow, humerus, shoulder or clavicle. -- Patient able to flex/extend fingers, wrist, elbow and shoulder with active and passive ROM without pain, +2/4 Radial pulse, cap refill <3sec, +AIN/PIN/Radial/Ulnar/Median N, distal sensation grossly intact to C4-T1 dermatomes, compartments soft and compressible. · bilateralLE: No obvious signs of trauma. -TTP to foot, ankle, leg, knee, thigh, hip.-- Patient able to flex/extend toes, ankle, knee and hip with active and passive ROM without pain,+2/4 DP & PT pulses, cap refill <3sec, +5/5 PF/DF/EHL, distal sensation grossly intact to L4-S1 dermatomes, compartments soft and compressible. · Pelvis: -TTP, -Log roll, -Heel strike     DATA:    CBC:   Lab Results   Component Value Date    WBC 9.8 11/04/2020    RBC 5.19 11/04/2020    HGB 16.0 11/04/2020    HCT 46.6 11/04/2020    MCV 89.8 11/04/2020    MCH 30.8 11/04/2020    MCHC 34.3 11/04/2020    RDW 13.1 11/04/2020     11/04/2020    MPV 9.6 11/04/2020     PT/INR:    Lab Results   Component Value Date    PROTIME 10.8 07/24/2019    INR 1.0 07/24/2019       Radiology Review:  X-rays of the left hand reveal no acute fractures or dislocations, soft tissue swelling is evident about the dorsum of the hand    IMPRESSION:  · Left hand abscess    PLAN:  · Ultrasound ordered to evaluate the location and proximal extension of the abscess  · Verbal consent was obtained from the patient, the area about the abscess was prepped with alcohol and infiltrated with local anesthetic without epinephrine. The hand was then prepped and draped in sterile fashion with betadyne. The area overlying the abscess was sharply incised with a 3 cm incision and purulent fluid was expressed. The area was then copiously irrigated with sterile saline. The would was packed, and the hand was dressed with soft wrap. Patient tolerated the procedure well and was neurovascularly intact following the procedure.   · Vancomycin and cefazolin were administered in the emergency department  · Sushil pillow, keep the left upper extremity elevated  · Discussed with Dr. Elin Lisa

## 2020-11-04 NOTE — ED PROVIDER NOTES
m) 184 lb 4 oz (83.6 kg)      Oxygen Saturation Interpretation: Normal.    Constitutional:  Alert, development consistent with age. HEENT:  NC/NT. Airway patent. Neck:  Normal ROM. Supple. Extremity(s):  Left: hand. Tenderness:  severe. Swelling: Severe. Deformity: No.               ROM: diminished range with pain. Skin:  See clinical image, below. Neurovascular: Motor deficit: severely decreased ROM, but without specific motor deficit. Sensory deficit: none. Pulse deficit: none. Capillary refill: normal.  Lymphatics: No lymphangitis or adenopathy noted. Neurological:  Oriented. Motor functions intact. **Informed Consent**    The patient has given verbal consent to have photos taken of left hand and inserted into their ED Provider Note as part of their permanent medical record for purposes of documentation, treatment management and/or medical review. All Images taken on 11/4/20 of patient name: Theresa Anderson were transmitted and stored on secured Izooble located within [facility] by a registered [employer] Mobile Application Device.      Lab / Imaging Results   (All laboratory and radiology results have been personally reviewed by myself)  Labs:  Results for orders placed or performed during the hospital encounter of 11/04/20   CBC Auto Differential   Result Value Ref Range    WBC 9.8 4.5 - 11.5 E9/L    RBC 5.19 3.80 - 5.80 E12/L    Hemoglobin 16.0 12.5 - 16.5 g/dL    Hematocrit 46.6 37.0 - 54.0 %    MCV 89.8 80.0 - 99.9 fL    MCH 30.8 26.0 - 35.0 pg    MCHC 34.3 32.0 - 34.5 %    RDW 13.1 11.5 - 15.0 fL    Platelets 757 238 - 843 E9/L    MPV 9.6 7.0 - 12.0 fL    Neutrophils % 76.5 43.0 - 80.0 %    Lymphocytes % 14.8 (L) 20.0 - 42.0 %    Monocytes % 4.3 2.0 - 12.0 %    Eosinophils % 3.5 0.0 - 6.0 %    Basophils % 0.9 0.0 - 2.0 %    Neutrophils Absolute 7.55 (H) 1.80 - 7.30 E9/L    Lymphocytes Absolute 1.47 (L) 1.50 - 4.00 E9/L    Monocytes Absolute 0.39 0.10 - 0.95 E9/L    Eosinophils Absolute 0.34 0.05 - 0.50 E9/L    Basophils Absolute 0.09 0.00 - 0.20 E9/L   Lactic Acid, Plasma   Result Value Ref Range    Lactic Acid 1.6 0.5 - 2.2 mmol/L   SPECIMEN REJECTION   Result Value Ref Range    Rejected Test esr     Reason for Rejection see below      Imaging: All Radiology results interpreted by Radiologist unless otherwise noted. XR HAND LEFT (MIN 3 VIEWS)   Preliminary Result   Soft tissue edema with no acute osseous abnormality. US EXTREMITY JOINT LEFT NON VASC COMPLETE    (Results Pending)     ED Course / Medical Decision Making     Medications   0.9 % sodium chloride bolus (has no administration in time range)   vancomycin (VANCOCIN) 1,250 mg in dextrose 5 % 250 mL IVPB (1,250 mg Intravenous New Bag 11/4/20 1449)   acetaminophen (TYLENOL) tablet 1,000 mg (1,000 mg Oral Given 11/4/20 1344)   ceFAZolin (ANCEF) 1 g in sterile water 10 mL IV syringe (1 g Intravenous Given 11/4/20 1445)     ED Course as of Nov 04 1524   Wed Nov 04, 2020   1423 ATTENDING PROVIDER ATTESTATION:     Pearl Ashford presented to the emergency department for evaluation of [unfilled]  I have reviewed and discussed the case, including pertinent history (medical, surgical, family and social) and exam findings with the Midlevel and the Nurse assigned to Pearl Ashford. I have personally performed and/or participated in the history, exam, medical decision making, and procedures and agree with all pertinent clinical information. I have reviewed my findings and recommendations with Pearl Ashford and members of family present at the time of disposition. My findings/plan: Patient is a 68-year-old male who presents the chief complaint of left hand swelling. Patient has a history of IV drug abuse and is currently in drug rehab. He is 3 months clean from heroin.   Patient states that he had a remaining needle left in his left hand and he got it out about a week ago with one of his friends. Patient started noticing some swelling and pain of the dorsal aspect of the left hand following that. Now the swelling is getting worse and start to travel down his wrist and he has pain with movement and making a fist.  States it is warm, no drainage. Denies any numbness or tingling the extremity. Denies any fevers or chills. On examination the patient is resting comfortably in bed. Clear breath sounds in all lung fields. Tachycardia present, regular rhythm. No abdominal tenderness palpation. No lower extremity edema. Patient does have a edema of the dorsal aspect left hand with erythema and warmth. Normal sensation of the fingers, but does have difficulty making a fist.  Radial pulse +2. Decreased range of motion at the wrist.  Erythema stops at the wrist.  He is otherwise alert and oriented x3. Nathalie Galdamez DO      [MS]      ED Course User Index  [MS] Mahsa Ardon DO   Re-examination:  11/4/20       Time: 1100   Patients symptoms are improving. He is requesting a nicotine patch. Plan has been discussed and he is agreeable. Consult(s):   IP CONSULT TO ORTHOPEDIC SURGERY I spoke with Dr. Greg Fernandez who will see the patient in the ED. I spoke with Dr. Benjamin Suárez who will admit the patient. Procedure(s):   none    MDM:      Patient presents to the emergency department for cellulitis of his left hand. Patient will be admitted for intravenous antibiotics. Counseling: The emergency provider has spoken with the patient and discussed todays results, in addition to providing specific details for the plan of care and counseling regarding the diagnosis and prognosis. Questions are answered at this time and they are agreeable with the plan. Assessment      1. Cellulitis of left hand    2.  History of intravenous drug abuse ([facility] [address]       Plan   Admit to med/surg floor  Patient condition is stable    New Medications     New Prescriptions    No medications on file     Electronically signed by Irma Watson PA-C   DD: 11/4/20  **This report was transcribed using voice recognition software. Every effort was made to ensure accuracy; however, inadvertent computerized transcription errors may be present.   END OF ED PROVIDER NOTE      Irma Watson PA-C  11/04/20 1523

## 2020-11-05 LAB
ALBUMIN SERPL-MCNC: 3.5 G/DL (ref 3.5–5.2)
ALP BLD-CCNC: 60 U/L (ref 40–129)
ALT SERPL-CCNC: 13 U/L (ref 0–40)
AMORPHOUS: NORMAL
AMPHETAMINE SCREEN, URINE: POSITIVE
ANION GAP SERPL CALCULATED.3IONS-SCNC: 10 MMOL/L (ref 7–16)
AST SERPL-CCNC: 12 U/L (ref 0–39)
BACTERIA: NORMAL /HPF
BARBITURATE SCREEN URINE: NOT DETECTED
BENZODIAZEPINE SCREEN, URINE: NOT DETECTED
BILIRUB SERPL-MCNC: 0.3 MG/DL (ref 0–1.2)
BILIRUBIN URINE: NEGATIVE
BLOOD, URINE: NEGATIVE
BUN BLDV-MCNC: 11 MG/DL (ref 6–20)
CALCIUM SERPL-MCNC: 8.5 MG/DL (ref 8.6–10.2)
CANNABINOID SCREEN URINE: POSITIVE
CHLORIDE BLD-SCNC: 102 MMOL/L (ref 98–107)
CLARITY: CLEAR
CO2: 24 MMOL/L (ref 22–29)
COCAINE METABOLITE SCREEN URINE: POSITIVE
COLOR: YELLOW
CREAT SERPL-MCNC: 0.9 MG/DL (ref 0.7–1.2)
EKG ATRIAL RATE: 71 BPM
EKG P AXIS: 40 DEGREES
EKG P-R INTERVAL: 160 MS
EKG Q-T INTERVAL: 362 MS
EKG QRS DURATION: 92 MS
EKG QTC CALCULATION (BAZETT): 393 MS
EKG R AXIS: 69 DEGREES
EKG T AXIS: 24 DEGREES
EKG VENTRICULAR RATE: 71 BPM
FENTANYL SCREEN, URINE: POSITIVE
GFR AFRICAN AMERICAN: >60
GFR NON-AFRICAN AMERICAN: >60 ML/MIN/1.73
GLUCOSE BLD-MCNC: 111 MG/DL (ref 74–99)
GLUCOSE URINE: NEGATIVE MG/DL
HBA1C MFR BLD: 5.1 % (ref 4–5.6)
HCT VFR BLD CALC: 39.9 % (ref 37–54)
HEMOGLOBIN: 13.2 G/DL (ref 12.5–16.5)
KETONES, URINE: NEGATIVE MG/DL
LEUKOCYTE ESTERASE, URINE: NEGATIVE
LV EF: 63 %
LVEF MODALITY: NORMAL
Lab: ABNORMAL
MCH RBC QN AUTO: 30.6 PG (ref 26–35)
MCHC RBC AUTO-ENTMCNC: 33.1 % (ref 32–34.5)
MCV RBC AUTO: 92.6 FL (ref 80–99.9)
METHADONE SCREEN, URINE: NOT DETECTED
NITRITE, URINE: NEGATIVE
OPIATE SCREEN URINE: NOT DETECTED
OXYCODONE URINE: POSITIVE
PDW BLD-RTO: 12.8 FL (ref 11.5–15)
PH UA: 7 (ref 5–9)
PHENCYCLIDINE SCREEN URINE: NOT DETECTED
PLATELET # BLD: 229 E9/L (ref 130–450)
PMV BLD AUTO: 9.7 FL (ref 7–12)
POTASSIUM SERPL-SCNC: 4.1 MMOL/L (ref 3.5–5)
PROTEIN UA: NEGATIVE MG/DL
RBC # BLD: 4.31 E12/L (ref 3.8–5.8)
RBC UA: NORMAL /HPF (ref 0–2)
SODIUM BLD-SCNC: 136 MMOL/L (ref 132–146)
SPECIFIC GRAVITY UA: 1.02 (ref 1–1.03)
TOTAL PROTEIN: 6.7 G/DL (ref 6.4–8.3)
UROBILINOGEN, URINE: 0.2 E.U./DL
VANCOMYCIN TROUGH: 8.3 MCG/ML (ref 5–16)
WBC # BLD: 8.3 E9/L (ref 4.5–11.5)
WBC UA: NORMAL /HPF (ref 0–5)

## 2020-11-05 PROCEDURE — 99233 SBSQ HOSP IP/OBS HIGH 50: CPT | Performed by: INTERNAL MEDICINE

## 2020-11-05 PROCEDURE — 87088 URINE BACTERIA CULTURE: CPT

## 2020-11-05 PROCEDURE — 93306 TTE W/DOPPLER COMPLETE: CPT

## 2020-11-05 PROCEDURE — 83036 HEMOGLOBIN GLYCOSYLATED A1C: CPT

## 2020-11-05 PROCEDURE — 2580000003 HC RX 258: Performed by: INTERNAL MEDICINE

## 2020-11-05 PROCEDURE — 81001 URINALYSIS AUTO W/SCOPE: CPT

## 2020-11-05 PROCEDURE — 6370000000 HC RX 637 (ALT 250 FOR IP): Performed by: INTERNAL MEDICINE

## 2020-11-05 PROCEDURE — 80053 COMPREHEN METABOLIC PANEL: CPT

## 2020-11-05 PROCEDURE — 86703 HIV-1/HIV-2 1 RESULT ANTBDY: CPT

## 2020-11-05 PROCEDURE — APPSS30 APP SPLIT SHARED TIME 16-30 MINUTES: Performed by: NURSE PRACTITIONER

## 2020-11-05 PROCEDURE — 85027 COMPLETE CBC AUTOMATED: CPT

## 2020-11-05 PROCEDURE — 2580000003 HC RX 258: Performed by: NURSE PRACTITIONER

## 2020-11-05 PROCEDURE — 1200000000 HC SEMI PRIVATE

## 2020-11-05 PROCEDURE — 6360000002 HC RX W HCPCS: Performed by: NURSE PRACTITIONER

## 2020-11-05 PROCEDURE — 6370000000 HC RX 637 (ALT 250 FOR IP): Performed by: NURSE PRACTITIONER

## 2020-11-05 PROCEDURE — 80074 ACUTE HEPATITIS PANEL: CPT

## 2020-11-05 PROCEDURE — 80202 ASSAY OF VANCOMYCIN: CPT

## 2020-11-05 PROCEDURE — 36415 COLL VENOUS BLD VENIPUNCTURE: CPT

## 2020-11-05 PROCEDURE — 6360000002 HC RX W HCPCS: Performed by: INTERNAL MEDICINE

## 2020-11-05 PROCEDURE — 93005 ELECTROCARDIOGRAM TRACING: CPT | Performed by: NURSE PRACTITIONER

## 2020-11-05 PROCEDURE — 80307 DRUG TEST PRSMV CHEM ANLYZR: CPT

## 2020-11-05 PROCEDURE — 6370000000 HC RX 637 (ALT 250 FOR IP): Performed by: PHYSICIAN ASSISTANT

## 2020-11-05 RX ORDER — CLONIDINE HYDROCHLORIDE 0.1 MG/1
0.1 TABLET ORAL PRN
Status: DISCONTINUED | OUTPATIENT
Start: 2020-11-05 | End: 2020-11-06 | Stop reason: HOSPADM

## 2020-11-05 RX ORDER — TRAMADOL HYDROCHLORIDE 50 MG/1
50 TABLET ORAL PRN
Status: DISCONTINUED | OUTPATIENT
Start: 2020-11-05 | End: 2020-11-06 | Stop reason: HOSPADM

## 2020-11-05 RX ORDER — HYDROXYZINE PAMOATE 25 MG/1
50 CAPSULE ORAL EVERY 8 HOURS PRN
Status: DISCONTINUED | OUTPATIENT
Start: 2020-11-05 | End: 2020-11-06 | Stop reason: HOSPADM

## 2020-11-05 RX ADMIN — VANCOMYCIN HYDROCHLORIDE 1250 MG: 10 INJECTION, POWDER, LYOPHILIZED, FOR SOLUTION INTRAVENOUS at 05:15

## 2020-11-05 RX ADMIN — CLONIDINE HYDROCHLORIDE 0.1 MG: 0.1 TABLET ORAL at 18:30

## 2020-11-05 RX ADMIN — Medication 10 ML: at 21:50

## 2020-11-05 RX ADMIN — TRAMADOL HYDROCHLORIDE 50 MG: 50 TABLET, FILM COATED ORAL at 18:30

## 2020-11-05 RX ADMIN — WATER 1 G: 1 INJECTION INTRAMUSCULAR; INTRAVENOUS; SUBCUTANEOUS at 14:37

## 2020-11-05 RX ADMIN — VANCOMYCIN HYDROCHLORIDE 1500 MG: 10 INJECTION, POWDER, LYOPHILIZED, FOR SOLUTION INTRAVENOUS at 18:21

## 2020-11-05 RX ADMIN — Medication 10 ML: at 09:05

## 2020-11-05 RX ADMIN — HYDROXYZINE PAMOATE 50 MG: 25 CAPSULE ORAL at 18:30

## 2020-11-05 RX ADMIN — WATER 1 G: 1 INJECTION INTRAMUSCULAR; INTRAVENOUS; SUBCUTANEOUS at 05:14

## 2020-11-05 RX ADMIN — WATER 1 G: 1 INJECTION INTRAMUSCULAR; INTRAVENOUS; SUBCUTANEOUS at 21:48

## 2020-11-05 RX ADMIN — OXYCODONE HYDROCHLORIDE AND ACETAMINOPHEN 1 TABLET: 5; 325 TABLET ORAL at 09:10

## 2020-11-05 ASSESSMENT — PAIN DESCRIPTION - PAIN TYPE: TYPE: ACUTE PAIN

## 2020-11-05 ASSESSMENT — PAIN DESCRIPTION - ORIENTATION: ORIENTATION: LEFT

## 2020-11-05 ASSESSMENT — PAIN SCALES - GENERAL
PAINLEVEL_OUTOF10: 0
PAINLEVEL_OUTOF10: 7
PAINLEVEL_OUTOF10: 6

## 2020-11-05 ASSESSMENT — PAIN DESCRIPTION - LOCATION: LOCATION: HAND

## 2020-11-05 ASSESSMENT — PAIN DESCRIPTION - DESCRIPTORS: DESCRIPTORS: THROBBING;SORE;TENDER

## 2020-11-05 NOTE — PROGRESS NOTES
Department of Orthopedic Surgery  Resident Progress Note    Patient seen and examined. Pain significantly improved and well controlled. No new complaints. Denies chest pain, shortness of breath, dizziness/lightheadedness.     VITALS:  BP (!) 130/57   Pulse 80   Temp 98.7 °F (37.1 °C) (Oral)   Resp 16   Ht 5' 11\" (1.803 m)   Wt 184 lb 4 oz (83.6 kg)   SpO2 99%   BMI 25.70 kg/m²     General: alert and oriented to person, place and time, well-developed and well-nourished, in no acute distress    MUSCULOSKELETAL:   left upper extremity:  · Dressing C/D/I  · Compartments soft and compressible  · Swelling improved  · Moves all fingers at MCP/PIP/DIP joints  · SILT distally to all fingers  · +AIN/PIN/Ulnar/Median/Radial nerve function intact grossly  · +2/4 Radial pulse, Cap refill <2 sec  · Sensation intact to touch in radial/ulnar/median nerve distributions to hand    CBC:   Lab Results   Component Value Date    WBC 8.3 11/05/2020    HGB 13.2 11/05/2020    HCT 39.9 11/05/2020     11/05/2020     PT/INR:    Lab Results   Component Value Date    PROTIME 10.8 07/24/2019    INR 1.0 07/24/2019       Cultures: pending    ASSESSMENT  · S/P L dorsal hand abscess bedside I&D - 11/4    PLAN      · Continue physical therapy and protocol: NWB - LUE  · abx coverage per medicine  · Will pull packing later today/tomorrow  · PT/OT  · Pain Control: per admitting  · Monitor H&H  · D/C Plan:  Pending medical eval  · Discuss with attending

## 2020-11-05 NOTE — PROGRESS NOTES
[address] [place], [place] [address]ist   Progress Note    Admitting Date and Time: 11/4/2020 12:41 PM  Admit Dx: Cellulitis of left hand [L03.114]    Subjective:    Patient seen and examined. He is s/p bedside bedside I&D of left hand yesterday. He complains of continued pain and edema in the left hand. ROS: denies fever, chills, cp, sob, n/v, HA unless stated above.      nicotine  1 patch Transdermal Daily    sodium chloride flush  10 mL Intravenous 2 times per day    enoxaparin  40 mg Subcutaneous Daily    ceFAZolin  1 g Intravenous Q8H    vancomycin  1,250 mg Intravenous Q8H     sodium chloride flush, 10 mL, PRN  acetaminophen, 650 mg, Q6H PRN    Or  acetaminophen, 650 mg, Q6H PRN  polyethylene glycol, 17 g, Daily PRN  promethazine, 12.5 mg, Q6H PRN    Or  ondansetron, 4 mg, Q6H PRN  perflutren lipid microspheres, 1.5 mL, ONCE PRN  oxyCODONE-acetaminophen, 1 tablet, Q4H PRN         Objective:    BP (!) 130/57   Pulse 76   Temp 98.7 °F (37.1 °C) (Oral)   Resp 16   Ht 5' 11\" (1.803 m)   Wt 184 lb 4 oz (83.6 kg)   SpO2 97%   BMI 25.70 kg/m²     General Appearance: alert and oriented to person, place and time and in no acute distress  Skin: warm and dry, left hand erythematous and edematous, dressing to left hand  Head: normocephalic and atraumatic  Eyes: pupils equal, round, and reactive to light, extraocular eye movements intact, conjunctivae normal  Mouth:  Dental caries  Neck: neck supple and non tender without mass   Pulmonary/Chest: clear to auscultation bilaterally- no wheezes, rales or rhonchi, no respiratory distress  Cardiovascular: normal rate, normal S1 and S2, murmur  Abdomen: soft, non-tender, non-distended, normal bowel sounds, no masses or organomegaly  Extremities: no cyanosis, no clubbing and 3+ edema left hand with erythema  Musculoskeletal:  Limited movement of left hand  Neurologic: no cranial nerve deficit and speech normal    Recent Labs     11/04/20  1500 11/04/20 2053 software. Every effort was made to ensure accuracy; however, inadvertent computerized transcription errors may be present.      Electronically signed by MYA Rose CNP on 11/5/2020 at 11:42 AM

## 2020-11-05 NOTE — CONSULTS
Infectious Disease Consult Note     Admit Date: 11/4/2020 12:41 PM    Chief complaint:  Left hand pain     Reason for Consult:  Left hand cellulitis with abscess     Requesting Physician:  Laurie Glaser MD    HISTORY OF PRESENT ILLNESS:    This is a 31year old male with history of MRSA bacteremia with vegetations (not treated) and intermittent drug use who states a needle got stuck in his hand three months ago. He is currently in drug rehab and clean x 82 days and states a few weeks ago he decided to dig the needle out of his hand. He retrieved the needle but then started having increased pain, redness, and swelling to left hand. Ortho was consulted in ER and ultrasound showed abscess which was excised and purulent drainage noted. He was given Vancomycin and Cefazolin in the ER and continues on now. Patient admits to using ecstasy a few days ago but tox screen shows amphetamines, cannabis, cocaine, fentanyl, and oxycodone. Id consulted for recommendations. REVIEW OF SYSTEMS:    Otherwise negative except as above     MEDICAL HISTORY  History reviewed. No pertinent past medical history. Immunization History   Administered Date(s) Administered    Tdap (Boostrix, Adacel) 04/07/2013, 08/16/2020     Past Surgical History:   Procedure Laterality Date    TONSILLECTOMY       FAMILY HISTORY  family history includes Breast Cancer in his maternal grandmother; Diabetes in his paternal grandfather.   SOCIAL HISTORY  Social History     Socioeconomic History    Marital status: Single     Spouse name: Not on file    Number of children: Not on file    Years of education: Not on file    Highest education level: Not on file   Occupational History    Not on file   Social Needs    Financial resource strain: Not on file    Food insecurity     Worry: Not on file     Inability: Not on file    Transportation needs     Medical: Not on file     Non-medical: Not on file   Tobacco Use    Smoking status: Current Every Day Smoker     Packs/day: 1.00     Years: 10.00     Pack years: 10.00     Types: Cigarettes, Cigars    Smokeless tobacco: Current User     Types: Chew   Substance and Sexual Activity    Alcohol use: Not Currently    Drug use: Not Currently     Types: Marijuana, Opiates , IV     Comment: occasionally    Sexual activity: Not on file   Lifestyle    Physical activity     Days per week: Not on file     Minutes per session: Not on file    Stress: Not on file   Relationships    Social connections     Talks on phone: Not on file     Gets together: Not on file     Attends Hinduism service: Not on file     Active member of club or organization: Not on file     Attends meetings of clubs or organizations: Not on file     Relationship status: Not on file    Intimate partner violence     Fear of current or ex partner: Not on file     Emotionally abused: Not on file     Physically abused: Not on file     Forced sexual activity: Not on file   Other Topics Concern    Not on file   Social History Narrative    Not on file         Current Medications:     Scheduled Meds:   nicotine  1 patch Transdermal Daily    sodium chloride flush  10 mL Intravenous 2 times per day    enoxaparin  40 mg Subcutaneous Daily    ceFAZolin  1 g Intravenous Q8H    vancomycin  1,250 mg Intravenous Q8H     Continuous Infusions:  PRN Meds:sodium chloride flush, acetaminophen **OR** acetaminophen, polyethylene glycol, promethazine **OR** ondansetron, perflutren lipid microspheres, oxyCODONE-acetaminophen      PHYSICAL EXAM:  Vitals:    11/04/20 1650 11/04/20 2245 11/05/20 0615 11/05/20 0800   BP: 131/66  (!) 130/57    Pulse: 81 82 80 76   Resp: 16  16    Temp: 98.4 °F (36.9 °C)  98.7 °F (37.1 °C)    TempSrc: Oral  Oral    SpO2: 99% 98% 99% 97%   Weight:       Height: 5' 11\" (1.803 m)          General Appearance:       Alert, cooperative, no distress, appears stated age        Heent:    Normocephalic, atraumatic,     PERRL, conjunctiva/corneas clear no drainage or sinus tenderness      Neck:   Supple, symmetrical, trachea midline   Back:     Symmetric, no CVA tenderness   Lungs:     Clear to auscultation bilaterally, respirations unlabored   Chest Wall:    No tenderness or deformity    Heart:    Regular rate and rhythm, S1 and S2 normal, no murmur, rub or   gallop   Abdomen:     Soft, non-tender, bowel sounds active all four quadrants         Extremities:   Extremities normal, atraumatic, no cyanosis or edema   Pulses:   LE extremities   Skin:   Skin color, texture, turgor normal, no rashes or lesions +left hand with edema - dressed    Lymph nodes:   Cervical, supraclavicular, and axillary nodes normal   Neurologic:   CNII-XII intact, no focal deficits       LABS AND DATA REVIEW:     Recent Labs     11/04/20  1352 11/05/20  0446   WBC 9.8 8.3   HGB 16.0 13.2   HCT 46.6 39.9   MCV 89.8 92.6    229     Recent Labs     11/04/20  1500 11/04/20  2053 11/05/20  0446    137 136   K 4.1 3.7 4.1   CL 97* 96* 102   CO2 22 31* 24   BUN 9 11 11   CREATININE 0.9 1.0 0.9   GFRAA >60 >60 >60   LABGLOM >60 >60 >60   GLUCOSE 171* 97 111*   PROT 7.7  --  6.7   LABALBU 4.2  --  3.5   CALCIUM 9.1 9.0 8.5*   BILITOT 0.5  --  0.3   ALKPHOS 74  --  60   AST 19  --  12   ALT 18  --  13       BLOOD CX #1  No results for input(s): BC in the last 72 hours. BLOOD CX #2  No results for input(s): Francie Crys in the last 72 hours. WOUND culture  Recent Labs     11/04/20  1804   WNDABS Growth not present, incubation continues       URINE CULTURE  No results for input(s): LABURIN in the last 72 hours. 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 He is currently in drug rehab and clean x 82 days and states a few weeks ago he decided to dig the needle out of his hand.  He retrieved the needle but then started having increased pain, redness, and swelling to left hand. Ortho was consulted in ER and ultrasound showed abscess which was excised and purulent drainage noted. He was given Vancomycin and Cefazolin in the ER and continues on now. Patient admits to using ecstasy a few days ago but tox screen shows amphetamines, cannabis, cocaine, fentanyl, and oxycodone. Id consulted for recommendations. Wound cultures pending   All labs and cultures reviewed   Will continue on Vancomycin and Cefazolin at present   Echo pending   Continue local wound care   Would not recommend PICC line unless patient is admitted to [facility] or SNF due to drug abuse and positive tox screen     Thank you for consult. MYA Venegas - NP  11/5/2020  1:06 PM     Patient examined, is very angry and verbally little abusive  He wants discharge right now otherwise he is willing to sign AMA  I discussed at length with him the importance of staying here and getting his antibiotics  Once the cultures are finalized hopefully we can narrow down his antibiotic to oral antibiotic and discharge him    He finally agreed to stay for 24 hours    I have discussed the case, including pertinent history and physical  exam findings . I have seen and examined the patient and the key elements of the encounter have been performed by me. I agree with the assessment, plan and orders as documented.       Treatment plan as per my recommendation     Jhonny Ferguson MD, FACP  11/5/2020  7:13 PM

## 2020-11-05 NOTE — CARE COORDINATION
This note will not be viewable in [employer] for the following reason(s). Suspected substance abuse disorder. Peer Recovery Support Note    Name: Dre Witt  Date: 11/5/2020    Chief Complaint   Patient presents with    Hand Pain     left hand swollen took a needle from left hand 1 wk ago himself and now karol and swollen       Peer Support met with patient. [x] Support and education provided  [x] Resources provided   [] Treatment referral:   [] Other:   [] Patient declined peer recovery services     Referred By: Brooklyn Ochoa    Notes: Will see patient again on 11/6/20.     Signed: Meghana Ventura, 11/5/2020

## 2020-11-05 NOTE — CARE COORDINATION
SS Note/Discharge plan:  SS Consult noted for \"possible IV Abx\", per chart review pt has a history of IV drug abuse and admitted with a hand abscess and UDS was positive for multiple illicit drugs, met with pt, explained sw role and consult, sw actively listened and offered support, pt denies current IV drug use, states he was in intermediate for 3 months and admits to taking a \"E pill\" (Ecstacy) when he got out, pt reports his \"drug of choice\" was Fentanyl and that he has had drug treatment in the past, has had Methadone treatment and now attends NA meetings, pt says he lives with his aunt now who is supportive and pt wants to return to her home on discharge with [facility] , pt says his aunt would supervise him if home IV therapy would be needed however discussed concerns due to past IV drug abuse, pt admits to being at the hospital in the past and concerns about a diagnosis of \"possible endocarditis\" and to leaving A, pt says he has spoken with Chipper Lesches from [facility] in ED and agreeable to talking to him again, sw notified Chipper Lesches who will follow with pt for addiction support, pt accepted info on Addiction Recovery resources and [facility] PCP list, sw to follow as needed.  Electronically signed by STEPHANIA Baum on 11/5/2020 at 1:34 PM

## 2020-11-05 NOTE — PROGRESS NOTES
Pharmacy Consultation Note  (Antibiotic Dosing and Monitoring)    Initial consult date: 11/4/2020  Consulting physician: VIVIENNE Arteaga  Drug(s): Vancomycin  Indication: Cellulitis    Ht Readings from Last 1 Encounters:   11/04/20 5' 11\" (1.803 m)     Wt Readings from Last 1 Encounters:   11/04/20 184 lb 4 oz (83.6 kg)     Age/  Gender IBW DW  Allergy Information   31 y.o.   male 75.3 kg 83.6 kg  Penicillins          Date  Tmax WBC BUN/CR UOP  (mL/kg/hr) Drug/Dose Time   Given Level(s)   (Time) Comments   11/4  (#1) afebrile 9.8 9/0.9 -- Vancomycin 1250 mg IV x 1    Vancomycin 1250 mg IV q8hr 7778    2223     11/5  (#2) afebrile 8.3 11/0.9 -- Vancomycin 1250 mg IV q8hr  <Vancomycin 1,500 mg IV Q8H> 0515  <1800> Trough @ 1514 = 8.3 mcg/mL     11/6  (#3)             (#4)             (#5)             (#6)             (#7)             Estimated Creatinine Clearance: 130 mL/min (based on SCr of 0.9 mg/dL). UOP over the past 24 hours:     Intake/Output Summary (Last 24 hours) at 11/5/2020 0907  Last data filed at 11/4/2020 2253  Gross per 24 hour   Intake 640 ml   Output 500 ml   Net 140 ml     Other anti-infectives: Anti-infective Dose Date Initiated Date Stopped   Cefazolin 1g IV q8hr 11/4      Cultures:  available culture and sensitivity results were reviewed in EPIC  Cultures sent and are pending. Culture Date Result    Blood cx 1 11/4    Blood cx 2 11/4    Urine cx 11/4    Wound cx 11/4 NGTD          Assessment:  · Consulted by VIVIENNE Arteaga to dose/monitor vancomycin  · Goal trough level:  15-20 mcg/mL  · Pt is a 31 yoM with a history of IVDU presented with L hand cellulitis with possible abscess. · Serum creatinine today: 0.9 mg/dL; CrCl > 120 mL/min; baseline Scr ~ 0.9 mg/dL    Plan:  · Vancomycin 1250 mg IV q8hr  · Trough today @ <1300>.  Hold dose for level > 20 mcg/mL  · Follow renal function  · Pharmacist will follow and monitor/adjust dosing as necessary      Thank you for the consult,    Abelardo Bello PharmD, BCPS 11/5/2020 9:07 AM   Ext: [phone]    Addendum:    · Trough @ 4841 = 8.3 mcg/mL (collected ~ 2 hours late)  · Adjust dose to Vancomycin 1,500 mg IV Q8H  · Repeat trough at steady state on new regimen    Malka Johnson PharmD, BCPS 11/5/2020 5:28 PM   [phone]

## 2020-11-06 VITALS
RESPIRATION RATE: 16 BRPM | TEMPERATURE: 98 F | WEIGHT: 184.25 LBS | OXYGEN SATURATION: 98 % | DIASTOLIC BLOOD PRESSURE: 69 MMHG | BODY MASS INDEX: 25.79 KG/M2 | HEIGHT: 71 IN | SYSTOLIC BLOOD PRESSURE: 98 MMHG | HEART RATE: 61 BPM

## 2020-11-06 LAB
ALBUMIN SERPL-MCNC: 3.5 G/DL (ref 3.5–5.2)
ALP BLD-CCNC: 56 U/L (ref 40–129)
ALT SERPL-CCNC: 11 U/L (ref 0–40)
ANION GAP SERPL CALCULATED.3IONS-SCNC: 10 MMOL/L (ref 7–16)
AST SERPL-CCNC: 14 U/L (ref 0–39)
BILIRUB SERPL-MCNC: <0.2 MG/DL (ref 0–1.2)
BUN BLDV-MCNC: 13 MG/DL (ref 6–20)
CALCIUM SERPL-MCNC: 8.8 MG/DL (ref 8.6–10.2)
CHLORIDE BLD-SCNC: 102 MMOL/L (ref 98–107)
CO2: 24 MMOL/L (ref 22–29)
CREAT SERPL-MCNC: 1 MG/DL (ref 0.7–1.2)
GFR AFRICAN AMERICAN: >60
GFR NON-AFRICAN AMERICAN: >60 ML/MIN/1.73
GLUCOSE BLD-MCNC: 95 MG/DL (ref 74–99)
HAV IGM SER IA-ACNC: ABNORMAL
HCT VFR BLD CALC: 40.2 % (ref 37–54)
HEMOGLOBIN: 13.6 G/DL (ref 12.5–16.5)
HEPATITIS B CORE IGM ANTIBODY: ABNORMAL
HEPATITIS B SURFACE ANTIGEN INTERPRETATION: ABNORMAL
HEPATITIS C ANTIBODY INTERPRETATION: REACTIVE
HIV-1 AND HIV-2 ANTIBODIES: NORMAL
MCH RBC QN AUTO: 30.6 PG (ref 26–35)
MCHC RBC AUTO-ENTMCNC: 33.8 % (ref 32–34.5)
MCV RBC AUTO: 90.5 FL (ref 80–99.9)
PDW BLD-RTO: 12.7 FL (ref 11.5–15)
PLATELET # BLD: 275 E9/L (ref 130–450)
PMV BLD AUTO: 9.4 FL (ref 7–12)
POTASSIUM SERPL-SCNC: 4.4 MMOL/L (ref 3.5–5)
RBC # BLD: 4.44 E12/L (ref 3.8–5.8)
SODIUM BLD-SCNC: 136 MMOL/L (ref 132–146)
TOTAL PROTEIN: 7 G/DL (ref 6.4–8.3)
WBC # BLD: 6.7 E9/L (ref 4.5–11.5)
WOUND/ABSCESS: NORMAL

## 2020-11-06 PROCEDURE — 87522 HEPATITIS C REVRS TRNSCRPJ: CPT

## 2020-11-06 PROCEDURE — 80053 COMPREHEN METABOLIC PANEL: CPT

## 2020-11-06 PROCEDURE — 99239 HOSP IP/OBS DSCHRG MGMT >30: CPT | Performed by: INTERNAL MEDICINE

## 2020-11-06 PROCEDURE — 6370000000 HC RX 637 (ALT 250 FOR IP): Performed by: PHYSICIAN ASSISTANT

## 2020-11-06 PROCEDURE — 2580000003 HC RX 258: Performed by: NURSE PRACTITIONER

## 2020-11-06 PROCEDURE — 6360000002 HC RX W HCPCS: Performed by: NURSE PRACTITIONER

## 2020-11-06 PROCEDURE — 36415 COLL VENOUS BLD VENIPUNCTURE: CPT

## 2020-11-06 PROCEDURE — APPSS45 APP SPLIT SHARED TIME 31-45 MINUTES: Performed by: NURSE PRACTITIONER

## 2020-11-06 PROCEDURE — 85027 COMPLETE CBC AUTOMATED: CPT

## 2020-11-06 RX ORDER — DOXYCYCLINE HYCLATE 100 MG/1
100 CAPSULE ORAL 2 TIMES DAILY
Qty: 14 CAPSULE | Refills: 0 | Status: SHIPPED | OUTPATIENT
Start: 2020-11-06 | End: 2020-11-13

## 2020-11-06 RX ADMIN — WATER 1 G: 1 INJECTION INTRAMUSCULAR; INTRAVENOUS; SUBCUTANEOUS at 13:03

## 2020-11-06 RX ADMIN — VANCOMYCIN HYDROCHLORIDE 1500 MG: 10 INJECTION, POWDER, LYOPHILIZED, FOR SOLUTION INTRAVENOUS at 09:46

## 2020-11-06 RX ADMIN — WATER 1 G: 1 INJECTION INTRAMUSCULAR; INTRAVENOUS; SUBCUTANEOUS at 05:28

## 2020-11-06 RX ADMIN — VANCOMYCIN HYDROCHLORIDE 1500 MG: 10 INJECTION, POWDER, LYOPHILIZED, FOR SOLUTION INTRAVENOUS at 02:10

## 2020-11-06 RX ADMIN — Medication 10 ML: at 08:55

## 2020-11-06 ASSESSMENT — PAIN SCALES - GENERAL: PAINLEVEL_OUTOF10: 0

## 2020-11-06 NOTE — PROGRESS NOTES
Physician Progress Note      PATIENT:               Evens Ellis  CSN #:                  [id number]  :                       1990  ADMIT DATE:       2020 12:41 PM  [facility] DATE:  RESPONDING  PROVIDER #:        Joe FOX DO          QUERY TEXT:    Dear Ortho Provider,    Patient admitted with left had abscess. Per note dated  documentation of   I&D. To accurately reflect the procedure performed please further specify the depth   of tissue incised and drained: The medical record reflects the following:  Risk Factors: per documentation diagnosis of left hand abscess  Clinical Indicators: per Ortho note    The area overlying the abscess was   sharply incised with a 3 cm incision and purulent fluid was expressed. The   area was then copiously irrigated with sterile saline    Thank you  Ruther Skiff RN [facility]  [phone]  Options provided:  -- Skin only  -- Subcutaneous tissue  -- Fascia  -- Muscle  -- Other - I will add my own diagnosis  -- Disagree - Not applicable / Not valid  -- Disagree - Clinically unable to determine / Unknown  -- Refer to Clinical Documentation Reviewer    PROVIDER RESPONSE TEXT:    The depth of the drainage was down to and including subcutaneous tissue.     Query created by: Dennise Guevara on 2020 8:55 AM      Electronically signed by:  Bella Robb DO 2020 8:42 PM

## 2020-11-06 NOTE — PLAN OF CARE
Problem: Pain:  Goal: Pain level will decrease  Description: Pain level will decrease  11/5/2020 2135 by Romy Gustafson RN  Outcome: Met This Shift  11/5/2020 1042 by Safia Smith RN  Outcome: Met This Shift  Goal: Control of acute pain  Description: Control of acute pain  11/5/2020 2135 by Romy Gustafson RN  Outcome: Met This Shift  11/5/2020 1042 by Safia Smith RN  Outcome: Met This Shift  Goal: Control of chronic pain  Description: Control of chronic pain  Outcome: Met This Shift     Problem: Falls - Risk of:  Goal: Will remain free from falls  Description: Will remain free from falls  11/5/2020 2135 by Romy Gustafson RN  Outcome: Met This Shift  11/5/2020 1042 by Safia Smith RN  Outcome: Met This Shift  Goal: Absence of physical injury  Description: Absence of physical injury  11/5/2020 2135 by Romy Gustafson RN  Outcome: Met This Shift  11/5/2020 1042 by Safia Smith RN  Outcome: Met This Shift

## 2020-11-06 NOTE — DISCHARGE SUMMARY
and [facility] in the Emergency Department. On 11/4/20 he had a bedside I&D of the left dorsal hand abscess. ID was consulted. Blood cultures were sent and show no growth to date. Wound culture with no growth to date. An Echo was done with no vegetation seen. He did have a urine drug screen that was positive for amphetamines, cocaine, cannabinoids, fentanyl and oxycodone. A Hepatitis panel was sent and was positive for Hepatitis C ab. Hepatitis Viral Load was sent and is pending. He will need to follow up as an outpatient with GI and ID. He was instructed that he needs to be drug free for at least 6 months before he would be able to start Hepatitis treatment. He can be discharged home on oral doxycyline 100 mg twice a day for 7 days. He was seen by [facility] and support education and resources were provided to him.   He will need to follow up with Orthopedic Surgery for suture removal.     Discharge Exam:  Vitals:    11/05/20 0800 11/05/20 1800 11/06/20 0630 11/06/20 0900   BP:  (!) 149/66 98/69    Pulse: 76 66 61    Resp:  18 16    Temp:  98.3 °F (36.8 °C) 98 °F (36.7 °C)    TempSrc:  Oral Oral    SpO2: 97% 97% 98% 98%   Weight:       Height:         General Appearance: alert and oriented to person, place and time and in no acute distress  Skin: warm and dry, left hand erythematous and edematous, dressing to left hand  Head: normocephalic and atraumatic  Eyes: pupils equal, round, and reactive to light, extraocular eye movements intact, conjunctivae normal  Mouth:  Dental caries  Neck: neck supple and non tender without mass   Pulmonary/Chest: clear to auscultation bilaterally- no wheezes, rales or rhonchi, no respiratory distress  Cardiovascular: normal rate, normal S1 and S2, murmur  Abdomen: soft, non-tender, non-distended, normal bowel sounds, no masses or organomegaly  Extremities: no cyanosis, no clubbing and 2+ edema left hand with erythema  Musculoskeletal:  Limited movement of left hand  Neurologic: no cranial nerve deficit and speech normal    I/O last 3 completed shifts: In: 360 [P.O.:360]  Out: -   I/O this shift: In: 960 [P.O.:660; IV Piggyback:300]  Out: -       LABS:  Recent Labs     11/04/20  1500 11/04/20 2053 11/05/20  0446    137 136   K 4.1 3.7 4.1   CL 97* 96* 102   CO2 22 31* 24   BUN 9 11 11   CREATININE 0.9 1.0 0.9   GLUCOSE 171* 97 111*   CALCIUM 9.1 9.0 8.5*       Recent Labs     11/04/20  1352 11/05/20  0446   WBC 9.8 8.3   RBC 5.19 4.31   HGB 16.0 13.2   HCT 46.6 39.9   MCV 89.8 92.6   MCH 30.8 30.6   MCHC 34.3 33.1   RDW 13.1 12.8    229   MPV 9.6 9.7       No results for input(s): POCGLU in the last 72 hours. Imaging:  Xr Hand Left (min 3 Views)    Result Date: 11/5/2020  EXAMINATION: THREE XRAY VIEWS OF THE LEFT HAND 11/4/2020 2:41 pm COMPARISON: None. HISTORY: ORDERING SYSTEM PROVIDED HISTORY: infection dorsum of hand TECHNOLOGIST PROVIDED HISTORY: Reason for exam:->infection dorsum of hand FINDINGS: Severe soft tissue swelling dorsally. No acute fracture, dislocation, or bony destructive process. No gas collection or foreign body. Soft tissue edema with no acute osseous abnormality. Us Extremity Left Non Vasc Limited    Result Date: 11/4/2020  EXAMINATION: NONVASCULAR ULTRASOUND OF THE LEFT EXTREMITY 11/4/2020 3:44 pm COMPARISON: None. HISTORY: ORDERING SYSTEM PROVIDED HISTORY: abscess dorsal hand, evaluate for location and extension up the forearm please TECHNOLOGIST PROVIDED HISTORY: Reason for exam:->abscess dorsal hand, evaluate for location and extension up the forearm please What reading provider will be dictating this exam?->CRC FINDINGS: There is a a complex mass with surrounding increased color Doppler blood flow which measures 2.6 x 2.3 x 1.1 cm. This lesion is associated with dorsal soft tissue overlying the wrist and extends in the soft tissue overlying the bases of metacarpal bones.      Complex mass concerning for abscess located within dorsal soft tissue overlying the wrist.  Ultrasound follow-up recommended to assure resolution. Patient Instructions:   Current Discharge Medication List      START taking these medications    Details   doxycycline hyclate (VIBRAMYCIN) 100 MG capsule Take 1 capsule by mouth 2 times daily for 7 days  Qty: 14 capsule, Refills: 0               Note that more than 30 minutes was spent in preparing discharge papers, discussing discharge with patient, medication review, etc.    NOTE: This report was transcribed using voice recognition software.  Every effort was made to ensure accuracy; however, inadvertent computerized transcription errors may be present.     Signed:  Electronically signed by MYA Erazo CNP on 11/6/2020 at 12:51 PM

## 2020-11-06 NOTE — PROGRESS NOTES
Infectious Disease Progress Note     Chief complaint:  Left hand pain     SUBJECTIVE  Patient is anxious, agitated, verbally abusive   He is tolerating medications but states he will leave AMA unless discharged   Left hand bandaged   No complaints  Wants to leave     REVIEW OF SYSTEMS:    Otherwise negative except as above       PHYSICAL EXAM:  Vitals:    11/05/20 0800 11/05/20 1800 11/06/20 0630 11/06/20 0900   BP:  (!) 149/66 98/69    Pulse: 76 66 61    Resp:  18 16    Temp:  98.3 °F (36.8 °C) 98 °F (36.7 °C)    TempSrc:  Oral Oral    SpO2: 97% 97% 98% 98%   Weight:       Height:           General Appearance:       Alert, cooperative, no distress, appears stated age        Heent:    Normocephalic, atraumatic,     PERRL, conjunctiva/corneas clear     no drainage or sinus tenderness      Neck:   Supple, symmetrical, trachea midline   Back:     Symmetric, no CVA tenderness   Lungs:     Clear to auscultation bilaterally, respirations unlabored   Chest Wall:    No tenderness or deformity    Heart:    Regular rate and rhythm, S1 and S2 normal, no murmur, rub or   gallop   Abdomen:     Soft, non-tender, bowel sounds active all four quadrants         Extremities:   Extremities normal, atraumatic, no cyanosis or edema   Pulses:   LE extremities   Skin:   Skin color, texture, turgor normal, no rashes or lesions +left hand with edema - dressed    Lymph nodes:   Cervical, supraclavicular, and axillary nodes normal   Neurologic:   CNII-XII intact, no focal deficits       LABS AND DATA REVIEW:     Recent Labs     11/04/20  1352 11/05/20  0446   WBC 9.8 8.3   HGB 16.0 13.2   HCT 46.6 39.9   MCV 89.8 92.6    229     Recent Labs     11/04/20  1500 11/04/20  2053 11/05/20  0446    137 136   K 4.1 3.7 4.1   CL 97* 96* 102   CO2 22 31* 24   BUN 9 11 11   CREATININE 0.9 1.0 0.9   GFRAA >60 >60 >60   LABGLOM >60 >60 >60   GLUCOSE 171* 97 111*   PROT 7.7  --  6.7   LABALBU 4.2  --  3.5   CALCIUM 9.1 9.0 8.5*   BILITOT 0.5 --  0.3   ALKPHOS 74  --  60   AST 19  --  12   ALT 18  --  13       BLOOD CX #1  Recent Labs     11/04/20  1430   BC 24 Hours no growth     BLOOD CX #2  Recent Labs     11/04/20  1659   BLOODCULT2 24 Hours no growth       WOUND culture  Recent Labs     11/04/20  1804   WNDABS Growth not present       URINE CULTURE  No results for input(s): LABURIN in the last 72 hours. 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 He is currently in drug rehab and clean x 82 days and states a few weeks ago he decided to dig the needle out of his hand. He retrieved the needle but then started having increased pain, redness, and swelling to left hand. Ortho was consulted in ER and ultrasound showed abscess which was excised and purulent drainage noted. He was given Vancomycin and Cefazolin in the ER and continues on now. Patient admits to using ecstasy a few days ago but tox screen shows amphetamines, cannabis, cocaine, fentanyl, and oxycodone. Id consulted for recommendations. Wound cultures pending - negative to date   All labs and cultures reviewed   Echo negative for vegetations   Continue local wound care - refusing home care services   Will DC IV antibiotics and DC home on PO ATB - med rec done   [id number] Hailey velasquez DC from ID POV - discussed with hospitalist   Would recommend following up with ID for Hepatitis C Ab Reactive - needs viral load for treatment (likely will not qualify due to current drug use)    MYA Fernando - NP  11/6/2020  11:08 AM   I have discussed the case, including pertinent history and physical  exam findings . I have seen and examined the patient and the key elements of the encounter have been performed by me. I agree with the assessment, plan and orders as documented.       Treatment plan as per my recommendation     Jose Srivastava MD, FACP  11/6/2020  9:41 PM

## 2020-11-06 NOTE — PROGRESS NOTES
CLINICAL PHARMACY NOTE: MEDS TO [address] Select Patient?: No  Total # of Prescriptions Filled: 1   The following medications were delivered to the patient:  · DOXYCYCLINE HYCLATE 100 MG   Total # of Interventions Completed: 3  Time Spent (min): 15    Additional Documentation:

## 2020-11-06 NOTE — PROGRESS NOTES
Department of Orthopedic Surgery  Resident Progress Note    Patient seen and examined. Pain is well controlled. Patient denies numbness or tingling in the hand. Patient denies nausea, vomiting, fever, chills, shortness of breath, or chest pain.     VITALS:  BP 98/69   Pulse 61   Temp 98 °F (36.7 °C) (Oral)   Resp 16   Ht 5' 11\" (1.803 m)   Wt 184 lb 4 oz (83.6 kg)   SpO2 98%   BMI 25.70 kg/m²     General: alert and oriented to person, place and time, well-developed and well-nourished, in no acute distress    MUSCULOSKELETAL:   left upper extremity:  · Dressing C/D/I, dressing was taken down and packing was removed  · Compartments soft and compressible  · Swelling improved  · Moves all fingers at MCP/PIP/DIP joints  · SILT distally to all fingers  · +AIN/PIN/Ulnar/Median/Radial nerve function intact grossly  · +2/4 Radial pulse, Cap refill <2 sec  · Sensation intact to touch in radial/ulnar/median nerve distributions to hand    CBC:   Lab Results   Component Value Date    WBC 8.3 11/05/2020    HGB 13.2 11/05/2020    HCT 39.9 11/05/2020     11/05/2020     PT/INR:    Lab Results   Component Value Date    PROTIME 10.8 07/24/2019    INR 1.0 07/24/2019       Cultures: pending    ASSESSMENT  · S/P L dorsal hand abscess bedside I&D - 11/4    PLAN      · Continue physical therapy and protocol: [facility]  · abx coverage per medicine/infectious disease  · PT/OT  · Pain Control: per admitting  · Monitor H&H  · D/C Plan:  Pending medical eval  · Discuss with attending

## 2020-11-06 NOTE — ADT AUTH CERT
Utilization Reviews         Additional Clinical for 11/6 by Faith Willett RN         Review Status  Review Entered    In Primary  11/6/2020 14:37        Criteria Review             Additional clinical for reconsideration:        11/5     ID consult--This is a 33 year old male with history of MRSA bacteremia with vegetations (not treated) and intermittent drug use who states a needle got stuck in his hand three months ago. He is currently in drug rehab and clean x 82 days and states a few weeks ago he decided to dig the needle out of his hand. He retrieved the needle but then started having increased pain, redness, and swelling to left hand. Ortho was consulted in ER and ultrasound showed abscess which was excised and purulent drainage noted. He was given Vancomycin and Cefazolin in the ER and continues on now. Patient admits to using ecstasy a few days ago but tox screen shows amphetamines, cannabis, cocaine, fentanyl, and oxycodone. Id consulted for recommendations.   Skin:   Skin color, texture, turgor normal, no rashes or lesions +left hand with edema - dressed    ASSESSMENT & PLAN  This is a 33 year old male with history of MRSA bacteremia with vegetations (not treated) and intermittent drug use who states a needle got stuck in his hand three months ago. He is currently in drug rehab and clean x 82 days and states a few weeks ago he decided to dig the needle out of his hand. He retrieved the needle but then started having increased pain, redness, and swelling to left hand. Ortho was consulted in ER and ultrasound showed abscess which was excised and purulent drainage noted. He was given Vancomycin and Cefazolin in the ER and continues on now. Patient admits to using ecstasy a few days ago but tox screen shows amphetamines, cannabis, cocaine, fentanyl, and oxycodone.  Id consulted for recommendations.       Wound cultures pending   All labs and cultures reviewed   Will continue on Vancomycin and Cefazolin at present   Echo pending   Continue local wound care   Would not recommend PICC line unless patient is admitted to [facility] or SNF due to drug abuse and positive tox screen      Meds--ancef iv x 3,  vanc iv x 2,  ultram prn x 1,  percocoet prn x 1,              98 (36.7)    16    61    98/69       RA 98%     Orthopedic surgery--Patient seen and examined.  Pain is well controlled.  Patient denies numbness or tingling in the hand.  Patient denies nausea, vomiting, fever, chills, shortness of breath, or chest pain.   ASSESSMENT  · S/P L dorsal hand abscess bedside I&D -      PLAN      · Continue physical therapy and protocol: [facility]  · abx coverage per medicine/infectious disease  · PT/OT  · Pain Control: per admitting  · Monitor H&H     ID--Chief complaint:  Left hand pain      SUBJECTIVE  Patient is anxious, agitated, verbally abusive   He is tolerating medications but states he will leave AMA unless discharged   Left hand bandaged   No complaints  Wants to leave   Wound cultures pending - negative to date   All labs and cultures reviewed   Echo negative for vegetations   Continue local wound care - refusing home care services   Will DC IV antibiotics and DC home on PO ATB - med rec done   Ok to DC from ID POV - discussed with hospitalist   Would recommend following up with ID for Hepatitis C Ab Reactive - needs viral load for treatment (likely will not qualify due to current drug use)     Meds--ancef iv x 2 so far,  vanc iv x 2 so far,       Plan dc home on po doxy for 7 days.       PA Review by Gera Pugh RN         Review Status  Review Entered    In Primary  2020 15:36        Criteria Review      We recommend that the following pt's current hospitalization under INPATIENT   status is APPROPRIATE       Name: Elina Bobo   : 1990   CSN: [id number]     Clinical summary L dorsal hand abscess bedside I&D, IV ABX, uds positive for  polysubstance    Vitals stable  Labs and Imaging Await cx  MCG criteria applies yes  Comments       This chart was reviewed at 11:49 AM 11/5/2020    Dionna Lozano MD   Physician [facility]   CELL : [phone]

## 2020-11-07 LAB — URINE CULTURE, ROUTINE: NORMAL

## 2020-11-09 LAB
BLOOD CULTURE, ROUTINE: NORMAL
CULTURE, BLOOD 2: NORMAL

## 2020-11-11 LAB
HCV QNT BY NAAT IU/ML: NOT DETECTED IU/ML
HCV QNT BY NAAT LOG IU/ML: NOT DETECTED LOG IU/ML
INTERPRETATION: NOT DETECTED

## 2021-12-29 ENCOUNTER — APPOINTMENT (OUTPATIENT)
Dept: CT IMAGING | Age: 31
End: 2021-12-29
Payer: MEDICARE

## 2021-12-29 ENCOUNTER — HOSPITAL ENCOUNTER (EMERGENCY)
Age: 31
Discharge: HOME | End: 2021-12-29
Attending: EMERGENCY MEDICINE
Payer: MEDICARE

## 2021-12-29 VITALS
DIASTOLIC BLOOD PRESSURE: 89 MMHG | RESPIRATION RATE: 18 BRPM | OXYGEN SATURATION: 100 % | SYSTOLIC BLOOD PRESSURE: 158 MMHG | TEMPERATURE: 97.2 F | HEART RATE: 89 BPM

## 2021-12-29 DIAGNOSIS — K04.7 DENTAL ABSCESS: Primary | ICD-10-CM

## 2021-12-29 DIAGNOSIS — K08.9 POOR DENTITION: ICD-10-CM

## 2021-12-29 LAB
ALBUMIN SERPL-MCNC: 3.9 G/DL (ref 3.5–5.2)
ALP BLD-CCNC: 69 U/L (ref 40–129)
ALT SERPL-CCNC: 15 U/L (ref 0–40)
ANION GAP SERPL CALCULATED.3IONS-SCNC: 10 MMOL/L (ref 7–16)
AST SERPL-CCNC: 24 U/L (ref 0–39)
BASOPHILS ABSOLUTE: 0.03 E9/L (ref 0–0.2)
BASOPHILS RELATIVE PERCENT: 0.2 % (ref 0–2)
BILIRUB SERPL-MCNC: 0.5 MG/DL (ref 0–1.2)
BUN BLDV-MCNC: 9 MG/DL (ref 6–20)
CALCIUM SERPL-MCNC: 9.1 MG/DL (ref 8.6–10.2)
CHLORIDE BLD-SCNC: 97 MMOL/L (ref 98–107)
CO2: 25 MMOL/L (ref 22–29)
CREAT SERPL-MCNC: 0.7 MG/DL (ref 0.7–1.2)
EOSINOPHILS ABSOLUTE: 0.02 E9/L (ref 0.05–0.5)
EOSINOPHILS RELATIVE PERCENT: 0.2 % (ref 0–6)
GFR AFRICAN AMERICAN: >60
GFR NON-AFRICAN AMERICAN: >60 ML/MIN/1.73
GLUCOSE BLD-MCNC: 151 MG/DL (ref 74–99)
HCT VFR BLD CALC: 40.6 % (ref 37–54)
HEMOGLOBIN: 13.5 G/DL (ref 12.5–16.5)
IMMATURE GRANULOCYTES #: 0.05 E9/L
IMMATURE GRANULOCYTES %: 0.4 % (ref 0–5)
LACTIC ACID, SEPSIS: 1.4 MMOL/L (ref 0.5–1.9)
LYMPHOCYTES ABSOLUTE: 1.14 E9/L (ref 1.5–4)
LYMPHOCYTES RELATIVE PERCENT: 8.8 % (ref 20–42)
MCH RBC QN AUTO: 30.2 PG (ref 26–35)
MCHC RBC AUTO-ENTMCNC: 33.3 % (ref 32–34.5)
MCV RBC AUTO: 90.8 FL (ref 80–99.9)
MONOCYTES ABSOLUTE: 0.97 E9/L (ref 0.1–0.95)
MONOCYTES RELATIVE PERCENT: 7.5 % (ref 2–12)
NEUTROPHILS ABSOLUTE: 10.71 E9/L (ref 1.8–7.3)
NEUTROPHILS RELATIVE PERCENT: 82.9 % (ref 43–80)
PDW BLD-RTO: 12.1 FL (ref 11.5–15)
PLATELET # BLD: 256 E9/L (ref 130–450)
PMV BLD AUTO: 9.3 FL (ref 7–12)
POTASSIUM REFLEX MAGNESIUM: 4.5 MMOL/L (ref 3.5–5)
RBC # BLD: 4.47 E12/L (ref 3.8–5.8)
SODIUM BLD-SCNC: 132 MMOL/L (ref 132–146)
TOTAL PROTEIN: 7.5 G/DL (ref 6.4–8.3)
WBC # BLD: 12.9 E9/L (ref 4.5–11.5)

## 2021-12-29 PROCEDURE — 36415 COLL VENOUS BLD VENIPUNCTURE: CPT

## 2021-12-29 PROCEDURE — 83605 ASSAY OF LACTIC ACID: CPT

## 2021-12-29 PROCEDURE — 99284 EMERGENCY DEPT VISIT MOD MDM: CPT

## 2021-12-29 PROCEDURE — 6360000004 HC RX CONTRAST MEDICATION: Performed by: RADIOLOGY

## 2021-12-29 PROCEDURE — 70491 CT SOFT TISSUE NECK W/DYE: CPT

## 2021-12-29 PROCEDURE — 85025 COMPLETE CBC W/AUTO DIFF WBC: CPT

## 2021-12-29 PROCEDURE — 87040 BLOOD CULTURE FOR BACTERIA: CPT

## 2021-12-29 PROCEDURE — 80053 COMPREHEN METABOLIC PANEL: CPT

## 2021-12-29 RX ORDER — CLINDAMYCIN HYDROCHLORIDE 300 MG/1
600 CAPSULE ORAL 3 TIMES DAILY
Qty: 60 CAPSULE | Refills: 0 | Status: SHIPPED | OUTPATIENT
Start: 2021-12-29 | End: 2022-01-08

## 2021-12-29 RX ORDER — BUPIVACAINE HYDROCHLORIDE 5 MG/ML
30 INJECTION, SOLUTION EPIDURAL; INTRACAUDAL ONCE
Status: DISCONTINUED | OUTPATIENT
Start: 2021-12-29 | End: 2021-12-29 | Stop reason: HOSPADM

## 2021-12-29 RX ADMIN — IOPAMIDOL 75 ML: 755 INJECTION, SOLUTION INTRAVENOUS at 12:32

## 2021-12-29 ASSESSMENT — ENCOUNTER SYMPTOMS
CONSTIPATION: 0
COUGH: 0
TROUBLE SWALLOWING: 0
DIARRHEA: 0
NAUSEA: 0
VOMITING: 0
RHINORRHEA: 0
FACIAL SWELLING: 1
VOICE CHANGE: 0
ABDOMINAL PAIN: 0
SHORTNESS OF BREATH: 0

## 2021-12-29 ASSESSMENT — PAIN SCALES - GENERAL: PAINLEVEL_OUTOF10: 10

## 2021-12-29 NOTE — ED TRIAGE NOTES
Department of Emergency Medicine  FIRST PROVIDER TRIAGE NOTE             Independent MLP           12/29/21  10:21 AM EST    Date of Encounter: 12/29/21   MRN: [id number]      HPI: Hugh Graff is a 36 y.o. male who presents to the ED for Abscess (seen at [facility] 2 days ago. stated that he smells and tastes it in his mouth, and that it is already draining)   left sided facial swelling and dental pain starting 2 days ago. Reports he smells and tastes the infection draining. Went to [place] 2 days ago when it started and was not treated. Started taking a friend's keflex and bactrim yesterday. Swelling to left side of face started yesterday however significantly worsened today. ROS: Negative for cp, sob, fever or cough. PE: Gen Appearance/Constitutional: alert  CV: regular rate  Pulm: CTA bilat     Initial Plan of Care: All treatment areas with department are currently occupied. Plan to order/Initiate the following while awaiting opening in ED: labs and imaging studies.     Initial Plan of Care: Initiate Treatment-Testing, Proceed toTreatment Area When Bed Available for ED Attending/MLP to Continue Care    Electronically signed by ESTEFANIA Reed   DD: 12/29/21

## 2021-12-29 NOTE — ED PROVIDER NOTES
Patient is a 77-year-old male with history of marijuana use, IV drug abuse who presents to the emergency department with complaint of swelling to the left face. Patient states this has been going on for approximately 2 days, was seen at [facility] but it was a long wait and he was unable to be seen. Patient states that his left face has been swelling, there is tenderness and pain, pain is moderate in intensity, nonradiating, nothing makes better, nothing makes it worse, constant, aching. Patient denies fevers or chills, denies chest pain or shortness of breath, denies abdominal pain, urinary or GI symptoms. Patient states that he feels something draining into his mouth from the left upper tooth and has a very foul taste. Patient has been taking a friend's Keflex and Bactrim for 1 day that was given to him. Patient states that the swelling is worsening. Patient denies any change in voice, problems eating, chewing, swallowing. States he has not eaten in 2 days due to lack of appetite. Patient denies any difficulty breathing. Review of Systems   Constitutional: Positive for appetite change. Negative for chills, fatigue and fever. HENT: Positive for dental problem and facial swelling. Negative for congestion, rhinorrhea, trouble swallowing and voice change. Eyes: Negative for visual disturbance. Respiratory: Negative for cough and shortness of breath. Cardiovascular: Negative for chest pain, palpitations and leg swelling. Gastrointestinal: Negative for abdominal pain, constipation, diarrhea, nausea and vomiting. Endocrine: Negative for polyuria. Genitourinary: Negative for dysuria and urgency. Musculoskeletal: Negative for arthralgias and myalgias. Skin: Negative for rash and wound. Allergic/Immunologic: Negative for immunocompromised state. Neurological: Negative for dizziness, syncope, weakness, light-headedness, numbness and headaches. Psychiatric/Behavioral: Negative for confusion. The patient is not nervous/anxious. Physical Exam  Vitals and nursing note reviewed. Constitutional:       General: He is awake. He is not in acute distress. Appearance: He is normal weight. He is not ill-appearing or toxic-appearing. HENT:      Head: Normocephalic and atraumatic. Comments: Swelling and tenderness noted to the left cheek. No fever noted. Mouth/Throat:      Mouth: Mucous membranes are moist.      Pharynx: Oropharynx is clear. Eyes:      Extraocular Movements: Extraocular movements intact. Pupils: Pupils are equal, round, and reactive to light. Cardiovascular:      Rate and Rhythm: Normal rate and regular rhythm. Pulses: Normal pulses. Radial pulses are 2+ on the right side and 2+ on the left side. Heart sounds: Normal heart sounds. Pulmonary:      Effort: Pulmonary effort is normal.      Breath sounds: Normal breath sounds. Abdominal:      General: There is no distension. Palpations: Abdomen is soft. Tenderness: There is no abdominal tenderness. Musculoskeletal:         General: Normal range of motion. Cervical back: Normal range of motion and neck supple. Skin:     General: Skin is warm and dry. Capillary Refill: Capillary refill takes less than 2 seconds. Neurological:      General: No focal deficit present. Mental Status: He is alert and oriented to person, place, and time. GCS: GCS eye subscore is 4. GCS verbal subscore is 5. GCS motor subscore is 6. Cranial Nerves: No cranial nerve deficit. Sensory: No sensory deficit. Motor: No weakness. Psychiatric:         Behavior: Behavior is cooperative. MDM  Number of Diagnoses or Management Options  Dental abscess  Poor dentition  Diagnosis management comments: Patient is a 65-year-old male presents to the emergency department with complaint of facial swelling.   Patient has extremely poor dentition, does not see a dentist.  Patient states over the last few days it has been increasing in size with facial swelling, erythema, tenderness. Patient has felt drainage from his back tooth, and had severe halitosis. Patient presents awake, alert, following commands. Physical exam shows tenderness and swelling to the left cheek. Work-up including labs shows mild leukocytosis of 12.9. CT of the neck shows fluid collection overlies the anterior left maxillary dentition possibly an abscess measuring 1.7 x 0.5 0.55 cm. There is associated edema. Work-up was discussed with patient as well as need to follow-up with dentist for dental abscess. Patient was placed on clindamycin due to allergies and plan to go directly to the [facility] dentist clinic was discussed. Patient voiced understanding however stated he does not have a ride there. Patient was monitored in the ED and did not have change. Patient was able to be discharged in stable condition. Questions were answered at bedside. Amount and/or Complexity of Data Reviewed  Clinical lab tests: ordered and reviewed  Tests in the radiology section of CPT®: ordered and reviewed         ED Course as of 12/29/21 1952   Wed Dec 29, 2021   1137   ATTENDING PROVIDER ATTESTATION:     I have personally performed and/or participated in the history, exam, medical decision making, and procedures and agree with all pertinent clinical information unless otherwise noted. I have also reviewed and agree with the past medical, family and social history unless otherwise noted. I have discussed this patient in detail with the resident and provided the instruction and education regarding the evidence-based evaluation and treatment of dental pain. History: patient developed edema of the left face with drainage and pain of the upper left teeth and gum. He does not follow with a dentist.  He is taking a friends old antibiotic prescription.     My findings: Gaby Hinds is a 36 y.o. male whom is in no distress. Physical exam reveals edema of the left face with diffuse erythema and tenderness. Upper gum line with edema, erythema and gingivitis. Multiple fractured teeth and caries. Drainage coming from left upper incisor. My plan: Symptomatic and supportive care. Will evaluate with imaging and labs and send to [address] [place] [address]. Electronically signed by Kasie Lara DO on 12/29/21 at 11:37 AM EST       [JS]   2122 Patient reevaluation patient found sleeping. Easily arousable. Patient was offered bupivacaine for infiltrative block for pain to his tooth. Patient declined. [QC]      ED Course User Index  [JS] Kasie Lara DO  [QC] Ruben Meek MD      ED Course as of 12/29/21 1952   Wed Dec 29, 2021   1137   ATTENDING PROVIDER ATTESTATION:     I have personally performed and/or participated in the history, exam, medical decision making, and procedures and agree with all pertinent clinical information unless otherwise noted. I have also reviewed and agree with the past medical, family and social history unless otherwise noted. I have discussed this patient in detail with the resident and provided the instruction and education regarding the evidence-based evaluation and treatment of dental pain. History: patient developed edema of the left face with drainage and pain of the upper left teeth and gum. He does not follow with a dentist.  He is taking a friends old antibiotic prescription. My findings: Gaby Hinds is a 36 y.o. male whom is in no distress. Physical exam reveals edema of the left face with diffuse erythema and tenderness. Upper gum line with edema, erythema and gingivitis. Multiple fractured teeth and caries. Drainage coming from left upper incisor. My plan: Symptomatic and supportive care.  Will evaluate with imaging and labs and send to [address] [place] [address] clinic. Electronically signed by Cecelia Hernández DO on 12/29/21 at 11:37 AM EST       [JS]   8821 Patient reevaluation patient found sleeping. Easily arousable. Patient was offered bupivacaine for infiltrative block for pain to his tooth. Patient declined. [QC]      ED Course User Index  [JS] Cecelia Hernández DO  [QC] Rafa Deng MD       --------------------------------------------- PAST HISTORY ---------------------------------------------  Past Medical History:  has no past medical history on file. Past Surgical History:  has a past surgical history that includes Tonsillectomy. Social History:  reports that he has been smoking cigarettes and cigars. He has a 10.00 pack-year smoking history. He has quit using smokeless tobacco.  His smokeless tobacco use included chew. He reports previous alcohol use. He reports previous drug use. Drugs: Marijuana ([facility]), Opiates , and IV. Family History: family history includes Breast Cancer in his maternal grandmother; Diabetes in his paternal grandfather. The patients home medications have been reviewed.     Allergies: Penicillins    -------------------------------------------------- RESULTS -------------------------------------------------  Labs:  Results for orders placed or performed during the hospital encounter of 12/29/21   CBC Auto Differential   Result Value Ref Range    WBC 12.9 (H) 4.5 - 11.5 E9/L    RBC 4.47 3.80 - 5.80 E12/L    Hemoglobin 13.5 12.5 - 16.5 g/dL    Hematocrit 40.6 37.0 - 54.0 %    MCV 90.8 80.0 - 99.9 fL    MCH 30.2 26.0 - 35.0 pg    MCHC 33.3 32.0 - 34.5 %    RDW 12.1 11.5 - 15.0 fL    Platelets 913 572 - 408 E9/L    MPV 9.3 7.0 - 12.0 fL    Neutrophils % 82.9 (H) 43.0 - 80.0 %    Immature Granulocytes % 0.4 0.0 - 5.0 %    Lymphocytes % 8.8 (L) 20.0 - 42.0 %    Monocytes % 7.5 2.0 - 12.0 %    Eosinophils % 0.2 0.0 - 6.0 %    Basophils % 0.2 0.0 - 2.0 %    Neutrophils Absolute 10.71 (H) 1.80 - 7.30 E9/L    Immature Granulocytes # 0.05 E9/L    Lymphocytes Absolute 1.14 (L) 1.50 - 4.00 E9/L    Monocytes Absolute 0.97 (H) 0.10 - 0.95 E9/L    Eosinophils Absolute 0.02 (L) 0.05 - 0.50 E9/L    Basophils Absolute 0.03 0.00 - 0.20 E9/L   Comprehensive Metabolic Panel w/ Reflex to MG   Result Value Ref Range    Sodium 132 132 - 146 mmol/L    Potassium reflex Magnesium 4.5 3.5 - 5.0 mmol/L    Chloride 97 (L) 98 - 107 mmol/L    CO2 25 22 - 29 mmol/L    Anion Gap 10 7 - 16 mmol/L    Glucose 151 (H) 74 - 99 mg/dL    BUN 9 6 - 20 mg/dL    CREATININE 0.7 0.7 - 1.2 mg/dL    GFR Non-African American >60 >=60 mL/min/1.73    GFR African American >60     Calcium 9.1 8.6 - 10.2 mg/dL    Total Protein 7.5 6.4 - 8.3 g/dL    Albumin 3.9 3.5 - 5.2 g/dL    Total Bilirubin 0.5 0.0 - 1.2 mg/dL    Alkaline Phosphatase 69 40 - 129 U/L    ALT 15 0 - 40 U/L    AST 24 0 - 39 U/L   Lactate, Sepsis   Result Value Ref Range    Lactic Acid, Sepsis 1.4 0.5 - 1.9 mmol/L       Radiology:  CT SOFT TISSUE NECK W CONTRAST   Final Result   1. Loculated fluid collection overlies anterior left maxillary dentition   suggestive of gingival abscess measuring 1.7 x 0.5 cm.   2. Edema associated with gingiva and left facial soft tissue. 3. Numerous dental caries as well as multiple periapical abscesses associated   with right and left maxillary and mandibular dentition. 4. Multiple reactive, prominent anterior cervical chain lymph nodes. ------------------------- NURSING NOTES AND VITALS REVIEWED ---------------------------  Date / Time Roomed:  12/29/2021 10:28 AM  ED Bed Assignment:  22/22    The nursing notes within the ED encounter and vital signs as below have been reviewed.    BP (!) 158/89   Pulse 89   Temp 97.2 °F (36.2 °C) (Temporal)   Resp 18   SpO2 100%   Oxygen Saturation Interpretation: Normal      ------------------------------------------ PROGRESS NOTES ------------------------------------------  7:51 PM EST  I have spoken with the patient and discussed todays results, in addition to providing specific details for the plan of care and counseling regarding the diagnosis and prognosis. Their questions are answered at this time and they are agreeable with the plan. I discussed at length with them reasons for immediate return here for re evaluation. They will followup with their dentist by calling their office today to establish.      --------------------------------- ADDITIONAL PROVIDER NOTES ---------------------------------  At this time the patient is without objective evidence of an acute process requiring hospitalization or inpatient management. They have remained hemodynamically stable throughout their entire ED visit and are stable for discharge with outpatient follow-up. The plan has been discussed in detail and they are aware of the specific conditions for emergent return, as well as the importance of follow-up. Discharge Medication List as of 12/29/2021  1:24 PM      START taking these medications    Details   clindamycin (CLEOCIN) 300 MG capsule Take 2 capsules by mouth 3 times daily for 10 days, Disp-60 capsule, R-0Print           Diagnosis:  1. Dental abscess    2. Poor dentition      Disposition:  Patient's disposition: Discharge to home  Patient's condition is stable. 12/29/21, 7:51 PM EST.     This note is prepared by Du Ortega MD -PGY-2               Du Ortega MD  Resident  12/29/21 8042

## 2021-12-29 NOTE — ED NOTES
Bed: 22  Expected date:   Expected time:   Means of arrival:   Comments:  Term clean     Matt Jones MA  12/29/21 1028

## 2022-01-03 LAB
BLOOD CULTURE, ROUTINE: NORMAL
CULTURE, BLOOD 2: NORMAL

## 2022-07-10 ENCOUNTER — HOSPITAL ENCOUNTER (EMERGENCY)
Age: 32
Discharge: HOME | End: 2022-07-10

## 2022-07-10 VITALS
DIASTOLIC BLOOD PRESSURE: 55 MMHG | TEMPERATURE: 97.8 F | OXYGEN SATURATION: 97 % | HEART RATE: 64 BPM | SYSTOLIC BLOOD PRESSURE: 112 MMHG | RESPIRATION RATE: 18 BRPM

## 2022-07-10 ASSESSMENT — PAIN - FUNCTIONAL ASSESSMENT: PAIN_FUNCTIONAL_ASSESSMENT: NONE - DENIES PAIN

## 2023-04-06 ENCOUNTER — HOSPITAL ENCOUNTER (EMERGENCY)
Age: 33
Discharge: HOME | End: 2023-04-06
Attending: FAMILY MEDICINE
Payer: MEDICAID

## 2023-04-06 ENCOUNTER — APPOINTMENT (OUTPATIENT)
Dept: GENERAL RADIOLOGY | Age: 33
End: 2023-04-06
Payer: MEDICAID

## 2023-04-06 VITALS
RESPIRATION RATE: 16 BRPM | TEMPERATURE: 97.3 F | SYSTOLIC BLOOD PRESSURE: 135 MMHG | DIASTOLIC BLOOD PRESSURE: 62 MMHG | HEART RATE: 86 BPM | OXYGEN SATURATION: 100 %

## 2023-04-06 DIAGNOSIS — R09.1 PLEURISY: Primary | ICD-10-CM

## 2023-04-06 PROCEDURE — 71046 X-RAY EXAM CHEST 2 VIEWS: CPT

## 2023-04-06 RX ORDER — MULTIVITAMIN,THERAPEUTIC
1 TABLET ORAL DAILY
Qty: 30 TABLET | Refills: 0 | Status: SHIPPED | OUTPATIENT
Start: 2023-04-06

## 2023-04-06 RX ORDER — PREDNISONE 20 MG/1
40 TABLET ORAL DAILY
Qty: 8 TABLET | Refills: 0 | Status: SHIPPED | OUTPATIENT
Start: 2023-04-06 | End: 2023-04-10

## 2023-04-06 RX ORDER — NAPROXEN 500 MG/1
500 TABLET ORAL 2 TIMES DAILY
Qty: 20 TABLET | Refills: 0 | Status: SHIPPED | OUTPATIENT
Start: 2023-04-06 | End: 2023-04-16

## 2023-04-06 ASSESSMENT — PAIN - FUNCTIONAL ASSESSMENT: PAIN_FUNCTIONAL_ASSESSMENT: NONE - DENIES PAIN
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
## 2024-01-12 LAB
ALBUMIN SERPL-MCNC: 4.4 G/DL (ref 3.5–5.2)
ALP SERPL-CCNC: 67 U/L (ref 40–129)
ALT SERPL-CCNC: 21 U/L (ref 0–40)
ANION GAP SERPL CALCULATED.3IONS-SCNC: 13 MMOL/L (ref 7–16)
AST SERPL-CCNC: 23 U/L (ref 0–39)
BILIRUB SERPL-MCNC: 0.4 MG/DL (ref 0–1.2)
BUN SERPL-MCNC: 16 MG/DL (ref 6–20)
CALCIUM SERPL-MCNC: 9.5 MG/DL (ref 8.6–10.2)
CHLORIDE SERPL-SCNC: 104 MMOL/L (ref 98–107)
CO2 SERPL-SCNC: 26 MMOL/L (ref 22–29)
CREAT SERPL-MCNC: 1.4 MG/DL (ref 0.7–1.2)
ERYTHROCYTE [DISTWIDTH] IN BLOOD BY AUTOMATED COUNT: 12.5 % (ref 11.5–15)
GFR SERPL CREATININE-BSD FRML MDRD: >60 ML/MIN/1.73M2
GLUCOSE SERPL-MCNC: 75 MG/DL (ref 74–99)
HCT VFR BLD AUTO: 48.9 % (ref 37–54)
HGB BLD-MCNC: 16 G/DL (ref 12.5–16.5)
MCH RBC QN AUTO: 30.8 PG (ref 26–35)
MCHC RBC AUTO-ENTMCNC: 32.7 G/DL (ref 32–34.5)
MCV RBC AUTO: 94 FL (ref 80–99.9)
PLATELET # BLD AUTO: 270 K/UL (ref 130–450)
PMV BLD AUTO: 10.5 FL (ref 7–12)
POTASSIUM SERPL-SCNC: 5.2 MMOL/L (ref 3.5–5)
PROT SERPL-MCNC: 7.6 G/DL (ref 6.4–8.3)
RBC # BLD AUTO: 5.2 M/UL (ref 3.8–5.8)
SODIUM SERPL-SCNC: 143 MMOL/L (ref 132–146)
WBC OTHER # BLD: 7.8 K/UL (ref 4.5–11.5)

## 2024-01-15 LAB
HIV 1+2 AB+HIV1 P24 AG SERPL QL IA: NONREACTIVE
RPR SER QL: NONREACTIVE

## 2024-12-07 ENCOUNTER — APPOINTMENT (OUTPATIENT)
Dept: GENERAL RADIOLOGY | Age: 34
End: 2024-12-07
Payer: COMMERCIAL

## 2024-12-07 ENCOUNTER — HOSPITAL ENCOUNTER (EMERGENCY)
Age: 34
Discharge: HOME | End: 2024-12-07
Payer: COMMERCIAL

## 2024-12-07 VITALS
HEART RATE: 95 BPM | OXYGEN SATURATION: 98 % | TEMPERATURE: 97.8 F | SYSTOLIC BLOOD PRESSURE: 123 MMHG | DIASTOLIC BLOOD PRESSURE: 76 MMHG | RESPIRATION RATE: 18 BRPM

## 2024-12-07 DIAGNOSIS — L03.116 CELLULITIS OF LEFT FOOT: Primary | ICD-10-CM

## 2024-12-07 DIAGNOSIS — F19.90 IVDU (INTRAVENOUS DRUG USER): ICD-10-CM

## 2024-12-07 LAB
ALBUMIN SERPL-MCNC: 4.9 G/DL (ref 3.5–5.2)
ALP SERPL-CCNC: 77 U/L (ref 40–129)
ALT SERPL-CCNC: 13 U/L (ref 0–40)
ANION GAP SERPL CALCULATED.3IONS-SCNC: 13 MMOL/L (ref 7–16)
AST SERPL-CCNC: 22 U/L (ref 0–39)
BASOPHILS # BLD: 0.07 K/UL (ref 0–0.2)
BASOPHILS NFR BLD: 1 % (ref 0–2)
BILIRUB SERPL-MCNC: 0.3 MG/DL (ref 0–1.2)
BUN SERPL-MCNC: 11 MG/DL (ref 6–20)
CALCIUM SERPL-MCNC: 9.8 MG/DL (ref 8.6–10.2)
CHLORIDE SERPL-SCNC: 97 MMOL/L (ref 98–107)
CO2 SERPL-SCNC: 29 MMOL/L (ref 22–29)
CREAT SERPL-MCNC: 0.8 MG/DL (ref 0.7–1.2)
EKG ATRIAL RATE: 72 BPM
EKG P AXIS: 70 DEGREES
EKG P-R INTERVAL: 132 MS
EKG Q-T INTERVAL: 378 MS
EKG QRS DURATION: 88 MS
EKG QTC CALCULATION (BAZETT): 413 MS
EKG R AXIS: 83 DEGREES
EKG T AXIS: 33 DEGREES
EKG VENTRICULAR RATE: 72 BPM
EOSINOPHIL # BLD: 0.27 K/UL (ref 0.05–0.5)
EOSINOPHILS RELATIVE PERCENT: 2 % (ref 0–6)
ERYTHROCYTE [DISTWIDTH] IN BLOOD BY AUTOMATED COUNT: 12.1 % (ref 11.5–15)
ERYTHROCYTE [SEDIMENTATION RATE] IN BLOOD BY WESTERGREN METHOD: 22 MM/HR (ref 0–15)
GFR, ESTIMATED: >90 ML/MIN/1.73M2
GLUCOSE SERPL-MCNC: 101 MG/DL (ref 74–99)
HCT VFR BLD AUTO: 44.8 % (ref 37–54)
HGB BLD-MCNC: 15.3 G/DL (ref 12.5–16.5)
IMM GRANULOCYTES # BLD AUTO: 0.04 K/UL (ref 0–0.58)
IMM GRANULOCYTES NFR BLD: 0 % (ref 0–5)
LACTATE BLDV-SCNC: 1.5 MMOL/L (ref 0.5–2.2)
LYMPHOCYTES NFR BLD: 2.06 K/UL (ref 1.5–4)
LYMPHOCYTES RELATIVE PERCENT: 17 % (ref 20–42)
MCH RBC QN AUTO: 31.1 PG (ref 26–35)
MCHC RBC AUTO-ENTMCNC: 34.2 G/DL (ref 32–34.5)
MCV RBC AUTO: 91.1 FL (ref 80–99.9)
MONOCYTES NFR BLD: 0.82 K/UL (ref 0.1–0.95)
MONOCYTES NFR BLD: 7 % (ref 2–12)
NEUTROPHILS NFR BLD: 73 % (ref 43–80)
NEUTS SEG NFR BLD: 8.62 K/UL (ref 1.8–7.3)
PLATELET # BLD AUTO: 319 K/UL (ref 130–450)
PMV BLD AUTO: 9.2 FL (ref 7–12)
POTASSIUM SERPL-SCNC: 3.7 MMOL/L (ref 3.5–5)
PROT SERPL-MCNC: 8.9 G/DL (ref 6.4–8.3)
RBC # BLD AUTO: 4.92 M/UL (ref 3.8–5.8)
SODIUM SERPL-SCNC: 139 MMOL/L (ref 132–146)
WBC OTHER # BLD: 11.9 K/UL (ref 4.5–11.5)

## 2024-12-07 PROCEDURE — 73630 X-RAY EXAM OF FOOT: CPT

## 2024-12-07 PROCEDURE — 80053 COMPREHEN METABOLIC PANEL: CPT

## 2024-12-07 PROCEDURE — 83605 ASSAY OF LACTIC ACID: CPT

## 2024-12-07 PROCEDURE — 93005 ELECTROCARDIOGRAM TRACING: CPT | Performed by: NURSE PRACTITIONER

## 2024-12-07 PROCEDURE — 71046 X-RAY EXAM CHEST 2 VIEWS: CPT

## 2024-12-07 PROCEDURE — 99285 EMERGENCY DEPT VISIT HI MDM: CPT

## 2024-12-07 PROCEDURE — 85652 RBC SED RATE AUTOMATED: CPT

## 2024-12-07 PROCEDURE — 87040 BLOOD CULTURE FOR BACTERIA: CPT

## 2024-12-07 PROCEDURE — 85025 COMPLETE CBC W/AUTO DIFF WBC: CPT

## 2024-12-07 PROCEDURE — 86140 C-REACTIVE PROTEIN: CPT

## 2024-12-07 PROCEDURE — 6370000000 HC RX 637 (ALT 250 FOR IP): Performed by: NURSE PRACTITIONER

## 2024-12-07 RX ORDER — CEFDINIR 300 MG/1
300 CAPSULE ORAL 2 TIMES DAILY
Qty: 14 CAPSULE | Refills: 0 | Status: SHIPPED | OUTPATIENT
Start: 2024-12-07 | End: 2024-12-14

## 2024-12-07 RX ORDER — DOXYCYCLINE 100 MG/1
100 CAPSULE ORAL ONCE
Status: COMPLETED | OUTPATIENT
Start: 2024-12-07 | End: 2024-12-07

## 2024-12-07 RX ORDER — CEFDINIR 300 MG/1
300 CAPSULE ORAL ONCE
Status: COMPLETED | OUTPATIENT
Start: 2024-12-07 | End: 2024-12-07

## 2024-12-07 RX ORDER — DOXYCYCLINE HYCLATE 100 MG
100 TABLET ORAL 2 TIMES DAILY
Qty: 14 TABLET | Refills: 0 | Status: SHIPPED | OUTPATIENT
Start: 2024-12-07 | End: 2024-12-14

## 2024-12-07 RX ADMIN — CEFDINIR 300 MG: 300 CAPSULE ORAL at 21:20

## 2024-12-07 RX ADMIN — DOXYCYCLINE HYCLATE 100 MG: 100 CAPSULE ORAL at 21:20

## 2024-12-07 ASSESSMENT — LIFESTYLE VARIABLES
HOW OFTEN DO YOU HAVE A DRINK CONTAINING ALCOHOL: NEVER
HOW MANY STANDARD DRINKS CONTAINING ALCOHOL DO YOU HAVE ON A TYPICAL DAY: PATIENT DOES NOT DRINK

## 2024-12-08 LAB
CRP SERPL HS-MCNC: 35 MG/L (ref 0–5)
MICROORGANISM SPEC CULT: NORMAL
MICROORGANISM SPEC CULT: NORMAL
SERVICE CMNT-IMP: NORMAL
SERVICE CMNT-IMP: NORMAL
SPECIMEN DESCRIPTION: NORMAL
SPECIMEN DESCRIPTION: NORMAL

## 2024-12-08 NOTE — ED PROVIDER NOTES
Independent DOMINGO Visit.     [facility] EMERGENCY DEPARTMENT  EMERGENCY DEPARTMENT ENCOUNTER      Pt Name: Chilango Velazquez  MRN: [id number]  Birthdate 1990  Date of evaluation: 12/7/2024  Provider: MYA Lawler CNP  PCP: No primary care provider on file.  Note Started: 8:15 PM EST 12/7/24    CHIEF COMPLAINT       Chief Complaint   Patient presents with    Ankle Pain     L ankle pain; pt injected fentanyl into foot 3-4 days ago. Red area on ankle.        HISTORY OF PRESENT ILLNESS: 1 or more Elements   History From: Patient  Limitations to history : None    Chilango Velazquez is a 34 y.o. male who has a past medical history of tobacco abuse, marijuana abuse, axillary abscess, MRSA bacteremia, cellulitis, drug abuse, history of IV drug use, hepatitis C presents to the emergency department with redness and swelling over the medial aspect of his right ankle.  States that he injected fentanyl to this area about 3 to 4 days ago, states that he used a clean needle but did not clean the area.  Noticed some redness and swelling in the area today.  Has not had any fevers, chills.  Is also complaining of some dental pain.    Nursing Notes were all reviewed and agreed with or any disagreements were addressed in the HPI.    REVIEW OF SYSTEMS :    Positives and Pertinent negatives as per HPI.     PAST MEDICAL HISTORY/Chronic Conditions Affecting Care    has no past medical history on file.     SURGICAL HISTORY     Past Surgical History:   Procedure Laterality Date    TONSILLECTOMY         CURRENTMEDICATIONS       Discharge Medication List as of 12/7/2024  9:22 PM        CONTINUE these medications which have NOT CHANGED    Details   naproxen (NAPROSYN) 500 MG tablet Take 1 tablet by mouth 2 times daily for 10 days, Disp-20 tablet, R-0Print      Multiple Vitamin (THERA/BETA-CAROTENE) TABS Take 1 tablet by mouth daily, Disp-30 tablet, R-0Print             ALLERGIES

## 2024-12-08 NOTE — DISCHARGE INSTRUCTIONS
Omnicef twice daily for 7 days, doxycycline twice daily for 7 days.  Please keep a close eye on this area, if it develops any increased redness, swelling, drainage or if you develop any fevers please return to the ER.

## 2024-12-09 LAB
EKG ATRIAL RATE: 72 BPM
EKG P AXIS: 70 DEGREES
EKG P-R INTERVAL: 132 MS
EKG Q-T INTERVAL: 378 MS
EKG QRS DURATION: 88 MS
EKG QTC CALCULATION (BAZETT): 413 MS
EKG R AXIS: 83 DEGREES
EKG T AXIS: 33 DEGREES
EKG VENTRICULAR RATE: 72 BPM

## 2024-12-09 PROCEDURE — 93010 ELECTROCARDIOGRAM REPORT: CPT | Performed by: INTERNAL MEDICINE

## 2024-12-13 LAB
MICROORGANISM SPEC CULT: NORMAL
MICROORGANISM SPEC CULT: NORMAL
SERVICE CMNT-IMP: NORMAL
SERVICE CMNT-IMP: NORMAL
SPECIMEN DESCRIPTION: NORMAL
SPECIMEN DESCRIPTION: NORMAL